# Patient Record
Sex: MALE | Race: ASIAN | NOT HISPANIC OR LATINO | URBAN - METROPOLITAN AREA
[De-identification: names, ages, dates, MRNs, and addresses within clinical notes are randomized per-mention and may not be internally consistent; named-entity substitution may affect disease eponyms.]

---

## 2020-01-06 ENCOUNTER — EMERGENCY (EMERGENCY)
Facility: HOSPITAL | Age: 55
LOS: 1 days | Discharge: HOME | End: 2020-01-06
Attending: EMERGENCY MEDICINE | Admitting: EMERGENCY MEDICINE
Payer: COMMERCIAL

## 2020-01-06 VITALS
SYSTOLIC BLOOD PRESSURE: 152 MMHG | HEART RATE: 77 BPM | TEMPERATURE: 98 F | OXYGEN SATURATION: 96 % | RESPIRATION RATE: 17 BRPM | DIASTOLIC BLOOD PRESSURE: 86 MMHG

## 2020-01-06 VITALS
HEART RATE: 76 BPM | TEMPERATURE: 98 F | OXYGEN SATURATION: 99 % | RESPIRATION RATE: 19 BRPM | DIASTOLIC BLOOD PRESSURE: 86 MMHG | SYSTOLIC BLOOD PRESSURE: 143 MMHG

## 2020-01-06 LAB
ALBUMIN SERPL ELPH-MCNC: 4.5 G/DL — SIGNIFICANT CHANGE UP (ref 3.3–5)
ALP SERPL-CCNC: 44 U/L — SIGNIFICANT CHANGE UP (ref 40–120)
ALT FLD-CCNC: 19 U/L — SIGNIFICANT CHANGE UP (ref 4–41)
ANION GAP SERPL CALC-SCNC: 13 MMO/L — SIGNIFICANT CHANGE UP (ref 7–14)
AST SERPL-CCNC: 13 U/L — SIGNIFICANT CHANGE UP (ref 4–40)
BASOPHILS # BLD AUTO: 0.07 K/UL — SIGNIFICANT CHANGE UP (ref 0–0.2)
BASOPHILS NFR BLD AUTO: 1 % — SIGNIFICANT CHANGE UP (ref 0–2)
BILIRUB SERPL-MCNC: 0.2 MG/DL — SIGNIFICANT CHANGE UP (ref 0.2–1.2)
BUN SERPL-MCNC: 16 MG/DL — SIGNIFICANT CHANGE UP (ref 7–23)
CALCIUM SERPL-MCNC: 9.7 MG/DL — SIGNIFICANT CHANGE UP (ref 8.4–10.5)
CHLORIDE SERPL-SCNC: 101 MMOL/L — SIGNIFICANT CHANGE UP (ref 98–107)
CO2 SERPL-SCNC: 26 MMOL/L — SIGNIFICANT CHANGE UP (ref 22–31)
CREAT SERPL-MCNC: 1.5 MG/DL — HIGH (ref 0.5–1.3)
EOSINOPHIL # BLD AUTO: 0.45 K/UL — SIGNIFICANT CHANGE UP (ref 0–0.5)
EOSINOPHIL NFR BLD AUTO: 6.2 % — HIGH (ref 0–6)
GLUCOSE SERPL-MCNC: 161 MG/DL — HIGH (ref 70–99)
HCT VFR BLD CALC: 43 % — SIGNIFICANT CHANGE UP (ref 39–50)
HGB BLD-MCNC: 14 G/DL — SIGNIFICANT CHANGE UP (ref 13–17)
IMM GRANULOCYTES NFR BLD AUTO: 1 % — SIGNIFICANT CHANGE UP (ref 0–1.5)
LYMPHOCYTES # BLD AUTO: 2.24 K/UL — SIGNIFICANT CHANGE UP (ref 1–3.3)
LYMPHOCYTES # BLD AUTO: 30.9 % — SIGNIFICANT CHANGE UP (ref 13–44)
MCHC RBC-ENTMCNC: 27.2 PG — SIGNIFICANT CHANGE UP (ref 27–34)
MCHC RBC-ENTMCNC: 32.6 % — SIGNIFICANT CHANGE UP (ref 32–36)
MCV RBC AUTO: 83.7 FL — SIGNIFICANT CHANGE UP (ref 80–100)
MONOCYTES # BLD AUTO: 0.55 K/UL — SIGNIFICANT CHANGE UP (ref 0–0.9)
MONOCYTES NFR BLD AUTO: 7.6 % — SIGNIFICANT CHANGE UP (ref 2–14)
NEUTROPHILS # BLD AUTO: 3.87 K/UL — SIGNIFICANT CHANGE UP (ref 1.8–7.4)
NEUTROPHILS NFR BLD AUTO: 53.3 % — SIGNIFICANT CHANGE UP (ref 43–77)
NRBC # FLD: 0 K/UL — SIGNIFICANT CHANGE UP (ref 0–0)
PLATELET # BLD AUTO: 236 K/UL — SIGNIFICANT CHANGE UP (ref 150–400)
PMV BLD: 10.9 FL — SIGNIFICANT CHANGE UP (ref 7–13)
POTASSIUM SERPL-MCNC: 4.2 MMOL/L — SIGNIFICANT CHANGE UP (ref 3.5–5.3)
POTASSIUM SERPL-SCNC: 4.2 MMOL/L — SIGNIFICANT CHANGE UP (ref 3.5–5.3)
PROT SERPL-MCNC: 7.4 G/DL — SIGNIFICANT CHANGE UP (ref 6–8.3)
RBC # BLD: 5.14 M/UL — SIGNIFICANT CHANGE UP (ref 4.2–5.8)
RBC # FLD: 13.1 % — SIGNIFICANT CHANGE UP (ref 10.3–14.5)
SODIUM SERPL-SCNC: 140 MMOL/L — SIGNIFICANT CHANGE UP (ref 135–145)
TROPONIN T, HIGH SENSITIVITY: 10 NG/L — SIGNIFICANT CHANGE UP (ref ?–14)
TSH SERPL-MCNC: 2.48 UIU/ML — SIGNIFICANT CHANGE UP (ref 0.27–4.2)
WBC # BLD: 7.25 K/UL — SIGNIFICANT CHANGE UP (ref 3.8–10.5)
WBC # FLD AUTO: 7.25 K/UL — SIGNIFICANT CHANGE UP (ref 3.8–10.5)

## 2020-01-06 PROCEDURE — 93010 ELECTROCARDIOGRAM REPORT: CPT

## 2020-01-06 PROCEDURE — 99284 EMERGENCY DEPT VISIT MOD MDM: CPT | Mod: 25

## 2020-01-06 RX ORDER — MECLIZINE HCL 12.5 MG
25 TABLET ORAL ONCE
Refills: 0 | Status: COMPLETED | OUTPATIENT
Start: 2020-01-06 | End: 2020-01-06

## 2020-01-06 RX ORDER — AMLODIPINE BESYLATE 2.5 MG/1
10 TABLET ORAL ONCE
Refills: 0 | Status: COMPLETED | OUTPATIENT
Start: 2020-01-06 | End: 2020-01-06

## 2020-01-06 RX ADMIN — AMLODIPINE BESYLATE 10 MILLIGRAM(S): 2.5 TABLET ORAL at 11:05

## 2020-01-06 RX ADMIN — Medication 25 MILLIGRAM(S): at 15:34

## 2020-01-06 RX ADMIN — Medication 25 MILLIGRAM(S): at 11:05

## 2020-01-06 NOTE — ED PROVIDER NOTE - ENMT, MLM
TM's clear bilaterally. No foreign body or cerumen in bilateral ear canals. Neck supple with no anterior mass. MMM's. No nystagmus.

## 2020-01-06 NOTE — ED ADULT NURSE REASSESSMENT NOTE - GENERAL PATIENT STATE
comfortable appearance/pt initially improved after 2nd dose of meclazine, but again has complaints of dizziness following duplex./family/SO at bedside

## 2020-01-06 NOTE — ED PROVIDER NOTE - PATIENT PORTAL LINK FT
You can access the FollowMyHealth Patient Portal offered by [facility] by registering at the following website: [email or web address]. By joining [employer]’s FollowMyHealth portal, you will also be able to view your health information using other applications (apps) compatible with our system.

## 2020-01-06 NOTE — ED PROVIDER NOTE - OBJECTIVE STATEMENT
53 y/o male presents to ED with two episodes of dizziness upon waking up in the middle of the night onset two nights ago. As per patient he woke up at 4am last night to use the bathroom, however he was overcome with dizziness which eventually resolved. The patient reports the dizziness as lightheadedness. His son assisted him with walking and the patient reports he felt the room spinning. After returning from the bathroom the dizziness improved and the patient notes he was able to get back into bed. He also notes he felt nauseous, however he had no episodes of emesis. The patient denies vomiting, diarrhea, fever, chills, headache, earache, recent travel, sick contacts, or any other medical problems. NKDA. IUTD.    PMH: Diabetes  Thyroid issues  High cholesterol  Hypertension  High lipids   PSH: negative  FH/SH: non-contributory, except as noted in the HPI  Allergies: No known drug allergies  Immunizations: Up-to-date  Medications: Amlodipine  10 mg in the morning (Did not take this morning)   Ramipril 10 mg at night (The patient took it took last night)   Methimazole   Metformin  One more medication the patient cannot recall.

## 2020-01-06 NOTE — ED ADULT NURSE REASSESSMENT NOTE - SYMPTOMS
pt continues to c.o. dizziness and wants more medicine as per wife. pt spoken to, does c.o. continued dizziness

## 2020-01-06 NOTE — ED PROVIDER NOTE - NSFOLLOWUPINSTRUCTIONS_ED_ALL_ED_FT
As we discussed return immediately to ER for persistent dizziness, chest pain or any symptoms in which you feel you require immediate care  Continue to hydrate yourself  Follow up with PCP in the next week

## 2020-01-06 NOTE — ED PROVIDER NOTE - CLINICAL SUMMARY MEDICAL DECISION MAKING FREE TEXT BOX
55 y/o male presents to ED with two episodes of dizziness upon waking up in the middle of the night onset two nights ago. likely vertigo and likely from middle ear source. Give Meclizine as well as check labs, including Troponin and thyroid. Looking for anemia, renal failure, and elevated troponin. Will not CT because neurologically functional.

## 2020-01-06 NOTE — ED PROVIDER NOTE - PHYSICAL EXAMINATION
Patient is well developed and well nourished.   Sitting in the room with his two children.  Neuro: Laying down with BP of 171/100 with a pulse of 76.   While standing with BP of 180/105 with BP of 86.   While doing orthostatic the patient became remarkably symptomatic upon laying flat.   The episode passed in moments with no nystagmus.   The patient reports he felt lightheaded and nauseous. He was sat back up and then repositioned to lay down again. He was once again symptomatic.   He was able to eventually tolerate laying back down flat.   The episodes were momentary never with nystagmus or change in neurological function.   No sensory or motor deficits.   AAO.

## 2020-01-06 NOTE — ED ADULT TRIAGE NOTE - CHIEF COMPLAINT QUOTE
pt c/o dizziness when going from lying down to standing. states he has 2 episodes since 4 am. denies cp/sob. f/s 171 by EMS pt c/o dizziness when going from lying down to standing. as per EMS ambulatory to ambulance with steady gait. states he has 2 episodes since 4 am. denies cp/sob. f/s 171 by EMS

## 2020-01-06 NOTE — ED PROVIDER NOTE - PROGRESS NOTE DETAILS
signed out to Dr. Hartley Naeem: pt feeling better; no symptoms; gait stable on my exam no cerebellar findings; spoke with Dr. Uriarte arterial duplex was normal; will d/c home to follow up with PCP

## 2020-01-06 NOTE — ED ADULT NURSE NOTE - CHIEF COMPLAINT QUOTE
pt c/o dizziness when going from lying down to standing. as per EMS ambulatory to ambulance with steady gait. states he has 2 episodes since 4 am. denies cp/sob. f/s 171 by EMS

## 2020-01-06 NOTE — ED ADULT NURSE NOTE - NSIMPLEMENTINTERV_GEN_ALL_ED
Implemented All Universal Safety Interventions:  [place] to call system. Call bell, personal items and telephone within reach. Instruct patient to call for assistance. Room bathroom lighting operational. Non-slip footwear when patient is off stretcher. Physically safe environment: no spills, clutter or unnecessary equipment. Stretcher in lowest position, wheels locked, appropriate side rails in place.

## 2020-01-06 NOTE — ED ADULT NURSE NOTE - OBJECTIVE STATEMENT
Pt presents to intake, A&Ox4, ambulatory w/o assistance, here for evaluation of dizziness since 4am, pt states he becomes dizzines when he changes position too fast. Denies chest pain, shortness of breath, palpitations, diaphoresis, headaches, fevers, dizziness, nausea, vomiting, diarrhea, or urinary symptoms at this time. Pt states he has not taken his BP meds this morning. No IV at this time as per MD orders, butterflied for labs by EDT at this time, meds given as ordered. Will continue to monitor.

## 2023-05-17 PROBLEM — Z00.00 ENCOUNTER FOR PREVENTIVE HEALTH EXAMINATION: Status: ACTIVE | Noted: 2023-05-17

## 2023-06-02 ENCOUNTER — APPOINTMENT (OUTPATIENT)
Dept: UROLOGY | Facility: CLINIC | Age: 58
End: 2023-06-02
Payer: COMMERCIAL

## 2023-06-02 VITALS
BODY MASS INDEX: 31.1 KG/M2 | RESPIRATION RATE: 17 BRPM | DIASTOLIC BLOOD PRESSURE: 88 MMHG | WEIGHT: 210 LBS | TEMPERATURE: 99.1 F | HEIGHT: 69 IN | SYSTOLIC BLOOD PRESSURE: 134 MMHG | HEART RATE: 79 BPM

## 2023-06-02 DIAGNOSIS — N28.9 DISORDER OF KIDNEY AND URETER, UNSPECIFIED: ICD-10-CM

## 2023-06-02 PROCEDURE — 99203 OFFICE O/P NEW LOW 30 MIN: CPT

## 2023-06-02 NOTE — HISTORY OF PRESENT ILLNESS
[FreeTextEntry1] : He is a 58  year old male who had CT scan in the setting of diarrhea after his return from [place].  Denies hematuria or any  other symptoms.  During work up , he was also found to have thyroid nodule for which they will be seeing endocrinology. \par \par PMH : HTN, HLP \par PSH : None\par FH : dad passed away from leukemia. Mom passed away from breast cancer \par SH:  , smoker , 30 pack /year , Three children all heallthy

## 2023-06-02 NOTE — ASSESSMENT
[FreeTextEntry1] : Reviewed images . Ct was without contrast which limits its interpretation . Patient states that his Cr is above 2 and he cant receive dye. Will proceed with US bubble study . \par He is going to [place] and will be back in August

## 2023-06-08 ENCOUNTER — APPOINTMENT (OUTPATIENT)
Dept: ULTRASOUND IMAGING | Facility: CLINIC | Age: 58
End: 2023-06-08

## 2023-06-09 ENCOUNTER — OUTPATIENT (OUTPATIENT)
Dept: OUTPATIENT SERVICES | Facility: HOSPITAL | Age: 58
LOS: 1 days | End: 2023-06-09
Payer: COMMERCIAL

## 2023-06-09 ENCOUNTER — APPOINTMENT (OUTPATIENT)
Dept: ULTRASOUND IMAGING | Facility: HOSPITAL | Age: 58
End: 2023-06-09

## 2023-06-09 ENCOUNTER — RESULT REVIEW (OUTPATIENT)
Age: 58
End: 2023-06-09

## 2023-06-09 DIAGNOSIS — N28.9 DISORDER OF KIDNEY AND URETER, UNSPECIFIED: ICD-10-CM

## 2023-06-09 PROCEDURE — 76978 US TRGT DYN MBUBB 1ST LES: CPT

## 2023-06-09 PROCEDURE — 76978 US TRGT DYN MBUBB 1ST LES: CPT | Mod: 26

## 2023-06-09 PROCEDURE — 76775 US EXAM ABDO BACK WALL LIM: CPT

## 2023-06-09 PROCEDURE — 76775 US EXAM ABDO BACK WALL LIM: CPT | Mod: 26,59

## 2023-08-16 ENCOUNTER — APPOINTMENT (OUTPATIENT)
Dept: ENDOCRINOLOGY | Facility: CLINIC | Age: 58
End: 2023-08-16
Payer: COMMERCIAL

## 2023-08-16 VITALS
SYSTOLIC BLOOD PRESSURE: 136 MMHG | OXYGEN SATURATION: 97 % | WEIGHT: 208.25 LBS | HEART RATE: 77 BPM | HEIGHT: 69 IN | DIASTOLIC BLOOD PRESSURE: 74 MMHG | TEMPERATURE: 98.6 F | BODY MASS INDEX: 30.84 KG/M2

## 2023-08-16 DIAGNOSIS — F17.200 NICOTINE DEPENDENCE, UNSPECIFIED, UNCOMPLICATED: ICD-10-CM

## 2023-08-16 DIAGNOSIS — E78.2 MIXED HYPERLIPIDEMIA: ICD-10-CM

## 2023-08-16 DIAGNOSIS — I10 ESSENTIAL (PRIMARY) HYPERTENSION: ICD-10-CM

## 2023-08-16 DIAGNOSIS — N18.32 CHRONIC KIDNEY DISEASE, STAGE 3B: ICD-10-CM

## 2023-08-16 DIAGNOSIS — Z87.891 PERSONAL HISTORY OF NICOTINE DEPENDENCE: ICD-10-CM

## 2023-08-16 LAB
GLUCOSE BLDC GLUCOMTR-MCNC: 170
HBA1C MFR BLD HPLC: 7.1

## 2023-08-16 PROCEDURE — 83036 HEMOGLOBIN GLYCOSYLATED A1C: CPT | Mod: QW

## 2023-08-16 PROCEDURE — 99204 OFFICE O/P NEW MOD 45 MIN: CPT

## 2023-08-16 PROCEDURE — 82962 GLUCOSE BLOOD TEST: CPT

## 2023-08-16 RX ORDER — BLOOD SUGAR DIAGNOSTIC
STRIP MISCELLANEOUS DAILY
Qty: 90 | Refills: 2 | Status: ACTIVE | COMMUNITY
Start: 2023-08-16 | End: 1900-01-01

## 2023-08-16 RX ORDER — SEMAGLUTIDE 1.34 MG/ML
4 INJECTION, SOLUTION SUBCUTANEOUS
Qty: 12 | Refills: 1 | Status: ACTIVE | COMMUNITY
Start: 2023-08-16 | End: 1900-01-01

## 2023-08-16 RX ORDER — GLUCOSAM/CHON-MSM1/C/MANG/BOSW 500-416.6
TABLET ORAL
Qty: 1 | Refills: 0 | Status: ACTIVE | COMMUNITY
Start: 2023-08-16 | End: 1900-01-01

## 2023-08-16 RX ORDER — METHIMAZOLE 10 MG/1
10 TABLET ORAL
Refills: 0 | Status: ACTIVE | COMMUNITY

## 2023-08-16 RX ORDER — GLIMEPIRIDE 2 MG/1
2 TABLET ORAL DAILY
Qty: 90 | Refills: 1 | Status: ACTIVE | COMMUNITY
Start: 2023-08-16 | End: 1900-01-01

## 2023-08-16 RX ORDER — DEXAMETHASONE 1 MG/1
1 TABLET ORAL
Qty: 1 | Refills: 0 | Status: ACTIVE | COMMUNITY
Start: 2023-08-16 | End: 1900-01-01

## 2023-08-16 RX ORDER — LANCETS 33 GAUGE
EACH MISCELLANEOUS
Qty: 1 | Refills: 3 | Status: ACTIVE | COMMUNITY
Start: 2023-08-16 | End: 1900-01-01

## 2023-08-16 RX ORDER — SITAGLIPTIN AND METFORMIN HYDROCHLORIDE 50; 1000 MG/1; MG/1
TABLET, FILM COATED ORAL
Refills: 0 | Status: ACTIVE | COMMUNITY

## 2023-08-16 RX ORDER — FENOFIBRATE 150 MG/1
CAPSULE ORAL
Refills: 0 | Status: ACTIVE | COMMUNITY

## 2023-08-16 RX ORDER — METOPROLOL TARTRATE 75 MG/1
TABLET, FILM COATED ORAL
Refills: 0 | Status: ACTIVE | COMMUNITY

## 2023-08-16 RX ORDER — RAMIPRIL 5 MG/1
CAPSULE ORAL
Refills: 0 | Status: ACTIVE | COMMUNITY

## 2023-08-16 RX ORDER — BLOOD-GLUCOSE METER
70 EACH MISCELLANEOUS
Qty: 3 | Refills: 3 | Status: ACTIVE | COMMUNITY
Start: 2023-08-16 | End: 1900-01-01

## 2023-08-16 NOTE — PHYSICAL EXAM
[Alert] : alert [Well Nourished] : well nourished [Obese] : obese [No Acute Distress] : no acute distress [Well Developed] : well developed [Normal Sclera/Conjunctiva] : normal sclera/conjunctiva [EOMI] : extra ocular movement intact [No Proptosis] : no proptosis [Normal Oropharynx] : the oropharynx was normal [No Respiratory Distress] : no respiratory distress [No Accessory Muscle Use] : no accessory muscle use [Clear to Auscultation] : lungs were clear to auscultation bilaterally [Normal S1, S2] : normal S1 and S2 [Normal Rate] : heart rate was normal [Regular Rhythm] : with a regular rhythm [No Edema] : no peripheral edema [Pedal Pulses Normal] : the pedal pulses are present [Normal Bowel Sounds] : normal bowel sounds [Not Tender] : non-tender [Not Distended] : not distended [Soft] : abdomen soft [Normal Anterior Cervical Nodes] : no anterior cervical lymphadenopathy [Normal Posterior Cervical Nodes] : no posterior cervical lymphadenopathy [No Spinal Tenderness] : no spinal tenderness [Spine Straight] : spine straight [No Stigmata of Cushings Syndrome] : no stigmata of Cushings Syndrome [Normal Gait] : normal gait [Normal Strength/Tone] : muscle strength and tone were normal [No Rash] : no rash [Normal Reflexes] : deep tendon reflexes were 2+ and symmetric [No Tremors] : no tremors [Oriented x3] : oriented to person, place, and time [Acanthosis Nigricans] : no acanthosis nigricans [de-identified] : left sided thyroid enlargement

## 2023-08-16 NOTE — REASON FOR VISIT
[DM Type 2] : DM Type 2 [Adrenal Evaluation/Adrenal Disorder] : adrenal evaluation/adrenal disorder [Thyroid nodule/ MNG] : thyroid nodule/ MNG [Weight Management/Obesity] : weight management/obesity [Hyperthyroidism] : hyperthyroidism

## 2023-08-16 NOTE — HISTORY OF PRESENT ILLNESS
[FreeTextEntry1] : 58 yearM here for assessment for Type 2 diabetes mellitus, Hyperthyroidism, Thyroid nodule, Adrenal nodule, obesity   last A1c: 7.1%  Patient with past medical history as below, remarkable for CKD 3b  Thirst and frequent urination:  no  Dry skin:  no  Vision problems: stable  Burning pain or tingling in the feet, legs, hands, other areas: manageable  High blood pressure:  no  Extreme hunger: no  Frequent and/or recurring urinary infections: no  Skin infections:  no   Slow healing of cuts: no     Screening  Ophthalmology: follows Microalbumin: Cr:  +ve, on ramipril  EGFR: 35   Current diabetic medication regimen (verified with patient):   janumet daily  ozempic 0.5mg Q weekly  glimepiride 2mg po daily   SMBG ranges (glucometer):  Does not check    58-year M here for assessment of adrenal adenoma:  1.4cm left adrenal adenoma -1.4 HU  Date of Imagin2023 Modality: CT A/P, non-contrast, patient has hx of CKD   History of known prior/ current malignancy: No     Episodic headaches: No  Episodic sweating: No Forceful palpitations: No Tremors: No Pallor: No Dyspnea: No  Generalized weakness: No Panic attacks: No Weight loss: No Polyuria: No Polydipsia: No  Constipation: No  Known history of elevations in blood pressure related to diagnostic procedures, anesthesia induction, surgery, with certain foods or beverages: No  History of HTN: yes  Renal impairment: yes  Appreciable fat buildup in face, neck, back, abdomen, chest: No Arms and legs becoming thin: No Purple stretch marks: No  Hyperthyroidism   Related thyroid hx:   Prior or current medication thyroid use: MMI 10mg po daily, has been using for 4 years  Known family or personal hx of thyroid disease: yes Goiter or hx of goiter: yes  Known Hx of autoimmune disease: No History of Radioactive iodine therapy/ Chest or Neck radiation therapy: No  Reported symptoms:   Fatigue: No Weight loss without significant change in appetite: No   Heat intolerance: No Irritability, anxiety or agitation: No Weakness, tremor: No Palpitations: No  Exertional dyspnea: No Hyper defecation: No Anterior neck pain: No  Insomnia: No  Dyspnea: No Dysphagia: No   Thyroid nodule:   Left lobe 4cm mass with calcifications, displacing trachea to the right

## 2023-09-24 ENCOUNTER — APPOINTMENT (OUTPATIENT)
Dept: ULTRASOUND IMAGING | Facility: IMAGING CENTER | Age: 58
End: 2023-09-24

## 2023-09-25 ENCOUNTER — OUTPATIENT (OUTPATIENT)
Dept: OUTPATIENT SERVICES | Facility: HOSPITAL | Age: 58
LOS: 1 days | End: 2023-09-25
Payer: COMMERCIAL

## 2023-09-25 ENCOUNTER — APPOINTMENT (OUTPATIENT)
Dept: ULTRASOUND IMAGING | Facility: IMAGING CENTER | Age: 58
End: 2023-09-25
Payer: COMMERCIAL

## 2023-09-25 DIAGNOSIS — E07.9 DISORDER OF THYROID, UNSPECIFIED: ICD-10-CM

## 2023-09-25 PROCEDURE — 76536 US EXAM OF HEAD AND NECK: CPT

## 2023-09-25 PROCEDURE — 76536 US EXAM OF HEAD AND NECK: CPT | Mod: 26

## 2023-10-19 ENCOUNTER — APPOINTMENT (OUTPATIENT)
Dept: ENDOCRINOLOGY | Facility: CLINIC | Age: 58
End: 2023-10-19
Payer: COMMERCIAL

## 2023-10-19 VITALS
SYSTOLIC BLOOD PRESSURE: 120 MMHG | OXYGEN SATURATION: 98 % | BODY MASS INDEX: 30.41 KG/M2 | WEIGHT: 205.31 LBS | HEART RATE: 82 BPM | DIASTOLIC BLOOD PRESSURE: 72 MMHG | HEIGHT: 69 IN | TEMPERATURE: 98.4 F

## 2023-10-19 DIAGNOSIS — E07.9 DISORDER OF THYROID, UNSPECIFIED: ICD-10-CM

## 2023-10-19 DIAGNOSIS — E66.9 OBESITY, UNSPECIFIED: ICD-10-CM

## 2023-10-19 DIAGNOSIS — E05.90 THYROTOXICOSIS, UNSPECIFIED W/OUT THYROTOXIC CRISIS OR STORM: ICD-10-CM

## 2023-10-19 DIAGNOSIS — E11.9 TYPE 2 DIABETES MELLITUS W/OUT COMPLICATIONS: ICD-10-CM

## 2023-10-19 DIAGNOSIS — D35.00 BENIGN NEOPLASM OF UNSPECIFIED ADRENAL GLAND: ICD-10-CM

## 2023-10-19 LAB
ALDOSTERONE SERUM: 29.4 NG/DL
CORTIS SERPL-MCNC: 3.2 UG/DL
DEXAMETHASONE LEVEL: 326 NG/DL
GLUCOSE BLDC GLUCOMTR-MCNC: 121
HBA1C MFR BLD HPLC: 6.9
METANEPHRINE, PL: <10 PG/ML
NORMETANEPHRINE, PL: 92.3 PG/ML
RENIN ACTIVITY, PLASMA: <0.167 NG/ML/HR
T4 FREE SERPL-MCNC: 1.2 NG/DL
TSH SERPL-ACNC: 2.82 UIU/ML
TSI ACT/NOR SER: 0.6 IU/L

## 2023-10-19 PROCEDURE — 99214 OFFICE O/P EST MOD 30 MIN: CPT

## 2023-10-19 PROCEDURE — 82962 GLUCOSE BLOOD TEST: CPT

## 2023-10-19 PROCEDURE — 83036 HEMOGLOBIN GLYCOSYLATED A1C: CPT | Mod: QW

## 2023-10-19 RX ORDER — DEXAMETHASONE 1 MG/1
1 TABLET ORAL
Qty: 1 | Refills: 0 | Status: ACTIVE | COMMUNITY
Start: 2023-10-19 | End: 1900-01-01

## 2023-11-09 ENCOUNTER — RESULT REVIEW (OUTPATIENT)
Age: 58
End: 2023-11-09

## 2023-11-14 ENCOUNTER — APPOINTMENT (OUTPATIENT)
Dept: ULTRASOUND IMAGING | Facility: IMAGING CENTER | Age: 58
End: 2023-11-14
Payer: COMMERCIAL

## 2023-11-14 ENCOUNTER — OUTPATIENT (OUTPATIENT)
Dept: OUTPATIENT SERVICES | Facility: HOSPITAL | Age: 58
LOS: 1 days | End: 2023-11-14
Payer: COMMERCIAL

## 2023-11-14 ENCOUNTER — RESULT REVIEW (OUTPATIENT)
Age: 58
End: 2023-11-14

## 2023-11-14 DIAGNOSIS — E07.9 DISORDER OF THYROID, UNSPECIFIED: ICD-10-CM

## 2023-11-14 PROCEDURE — 88173 CYTOPATH EVAL FNA REPORT: CPT | Mod: 26

## 2023-11-14 PROCEDURE — 10005 FNA BX W/US GDN 1ST LES: CPT

## 2023-11-14 PROCEDURE — 88173 CYTOPATH EVAL FNA REPORT: CPT

## 2023-11-20 LAB
NON-GYNECOLOGICAL CYTOLOGY STUDY: SIGNIFICANT CHANGE UP
NON-GYNECOLOGICAL CYTOLOGY STUDY: SIGNIFICANT CHANGE UP

## 2023-12-20 ENCOUNTER — APPOINTMENT (OUTPATIENT)
Dept: ENDOCRINOLOGY | Facility: CLINIC | Age: 58
End: 2023-12-20

## 2024-10-04 ENCOUNTER — APPOINTMENT (OUTPATIENT)
Dept: OPHTHALMOLOGY | Facility: CLINIC | Age: 59
End: 2024-10-04
Payer: COMMERCIAL

## 2024-10-04 ENCOUNTER — NON-APPOINTMENT (OUTPATIENT)
Age: 59
End: 2024-10-04

## 2024-10-04 ENCOUNTER — INPATIENT (INPATIENT)
Facility: HOSPITAL | Age: 59
LOS: 3 days | Discharge: HOME | End: 2024-10-08
Attending: STUDENT IN AN ORGANIZED HEALTH CARE EDUCATION/TRAINING PROGRAM | Admitting: STUDENT IN AN ORGANIZED HEALTH CARE EDUCATION/TRAINING PROGRAM
Payer: COMMERCIAL

## 2024-10-04 VITALS
OXYGEN SATURATION: 96 % | WEIGHT: 197.09 LBS | TEMPERATURE: 98 F | SYSTOLIC BLOOD PRESSURE: 166 MMHG | RESPIRATION RATE: 20 BRPM | DIASTOLIC BLOOD PRESSURE: 88 MMHG | HEART RATE: 75 BPM

## 2024-10-04 LAB
ALBUMIN SERPL ELPH-MCNC: 4.8 G/DL — SIGNIFICANT CHANGE UP (ref 3.3–5)
ALP SERPL-CCNC: 58 U/L — SIGNIFICANT CHANGE UP (ref 40–120)
ALT FLD-CCNC: 17 U/L — SIGNIFICANT CHANGE UP (ref 4–41)
ANION GAP SERPL CALC-SCNC: 15 MMOL/L — HIGH (ref 7–14)
APTT BLD: 35 SEC — SIGNIFICANT CHANGE UP (ref 24.5–35.6)
AST SERPL-CCNC: 15 U/L — SIGNIFICANT CHANGE UP (ref 4–40)
BASOPHILS # BLD AUTO: 0.07 K/UL — SIGNIFICANT CHANGE UP (ref 0–0.2)
BASOPHILS NFR BLD AUTO: 1 % — SIGNIFICANT CHANGE UP (ref 0–2)
BILIRUB SERPL-MCNC: 0.2 MG/DL — SIGNIFICANT CHANGE UP (ref 0.2–1.2)
BLD GP AB SCN SERPL QL: NEGATIVE — SIGNIFICANT CHANGE UP
BUN SERPL-MCNC: 26 MG/DL — HIGH (ref 7–23)
CALCIUM SERPL-MCNC: 10 MG/DL — SIGNIFICANT CHANGE UP (ref 8.4–10.5)
CHLORIDE SERPL-SCNC: 105 MMOL/L — SIGNIFICANT CHANGE UP (ref 98–107)
CO2 SERPL-SCNC: 24 MMOL/L — SIGNIFICANT CHANGE UP (ref 22–31)
CREAT SERPL-MCNC: 2.29 MG/DL — HIGH (ref 0.5–1.3)
EGFR: 32 ML/MIN/1.73M2 — LOW
EOSINOPHIL # BLD AUTO: 0.47 K/UL — SIGNIFICANT CHANGE UP (ref 0–0.5)
EOSINOPHIL NFR BLD AUTO: 6.5 % — HIGH (ref 0–6)
GLUCOSE SERPL-MCNC: 240 MG/DL — HIGH (ref 70–99)
HCT VFR BLD CALC: 39.4 % — SIGNIFICANT CHANGE UP (ref 39–50)
HGB BLD-MCNC: 13.1 G/DL — SIGNIFICANT CHANGE UP (ref 13–17)
IANC: 4.27 K/UL — SIGNIFICANT CHANGE UP (ref 1.8–7.4)
IMM GRANULOCYTES NFR BLD AUTO: 1 % — HIGH (ref 0–0.9)
INR BLD: 0.95 RATIO — SIGNIFICANT CHANGE UP (ref 0.85–1.16)
LYMPHOCYTES # BLD AUTO: 1.94 K/UL — SIGNIFICANT CHANGE UP (ref 1–3.3)
LYMPHOCYTES # BLD AUTO: 26.7 % — SIGNIFICANT CHANGE UP (ref 13–44)
MCHC RBC-ENTMCNC: 26.6 PG — LOW (ref 27–34)
MCHC RBC-ENTMCNC: 33.2 GM/DL — SIGNIFICANT CHANGE UP (ref 32–36)
MCV RBC AUTO: 79.9 FL — LOW (ref 80–100)
MONOCYTES # BLD AUTO: 0.45 K/UL — SIGNIFICANT CHANGE UP (ref 0–0.9)
MONOCYTES NFR BLD AUTO: 6.2 % — SIGNIFICANT CHANGE UP (ref 2–14)
NEUTROPHILS # BLD AUTO: 4.27 K/UL — SIGNIFICANT CHANGE UP (ref 1.8–7.4)
NEUTROPHILS NFR BLD AUTO: 58.6 % — SIGNIFICANT CHANGE UP (ref 43–77)
NRBC # BLD: 0 /100 WBCS — SIGNIFICANT CHANGE UP (ref 0–0)
NRBC # FLD: 0 K/UL — SIGNIFICANT CHANGE UP (ref 0–0)
PLATELET # BLD AUTO: 242 K/UL — SIGNIFICANT CHANGE UP (ref 150–400)
POTASSIUM SERPL-MCNC: 3.8 MMOL/L — SIGNIFICANT CHANGE UP (ref 3.5–5.3)
POTASSIUM SERPL-SCNC: 3.8 MMOL/L — SIGNIFICANT CHANGE UP (ref 3.5–5.3)
PROT SERPL-MCNC: 8 G/DL — SIGNIFICANT CHANGE UP (ref 6–8.3)
PROTHROM AB SERPL-ACNC: 11 SEC — SIGNIFICANT CHANGE UP (ref 9.9–13.4)
RBC # BLD: 4.93 M/UL — SIGNIFICANT CHANGE UP (ref 4.2–5.8)
RBC # FLD: 12.7 % — SIGNIFICANT CHANGE UP (ref 10.3–14.5)
RH IG SCN BLD-IMP: POSITIVE — SIGNIFICANT CHANGE UP
SODIUM SERPL-SCNC: 144 MMOL/L — SIGNIFICANT CHANGE UP (ref 135–145)
T3 SERPL-MCNC: 81 NG/DL — SIGNIFICANT CHANGE UP (ref 80–200)
T4 AB SER-ACNC: 7.76 UG/DL — SIGNIFICANT CHANGE UP (ref 5.1–13)
TSH SERPL-MCNC: 0.17 UIU/ML — LOW (ref 0.27–4.2)
WBC # BLD: 7.27 K/UL — SIGNIFICANT CHANGE UP (ref 3.8–10.5)
WBC # FLD AUTO: 7.27 K/UL — SIGNIFICANT CHANGE UP (ref 3.8–10.5)

## 2024-10-04 PROCEDURE — 92285 EXTERNAL OCULAR PHOTOGRAPHY: CPT

## 2024-10-04 PROCEDURE — 99205 OFFICE O/P NEW HI 60 MIN: CPT

## 2024-10-04 PROCEDURE — 92133 CPTRZD OPH DX IMG PST SGM ON: CPT

## 2024-10-04 PROCEDURE — 92083 EXTENDED VISUAL FIELD XM: CPT

## 2024-10-04 PROCEDURE — 99285 EMERGENCY DEPT VISIT HI MDM: CPT

## 2024-10-04 RX ORDER — METHYLPREDNISOLONE ACETATE 80 MG/ML
1000 INJECTION, SUSPENSION INTRA-ARTICULAR; INTRALESIONAL; INTRAMUSCULAR; SOFT TISSUE ONCE
Refills: 0 | Status: COMPLETED | OUTPATIENT
Start: 2024-10-04 | End: 2024-10-04

## 2024-10-04 RX ADMIN — METHYLPREDNISOLONE ACETATE 50 MILLIGRAM(S): 80 INJECTION, SUSPENSION INTRA-ARTICULAR; INTRALESIONAL; INTRAMUSCULAR; SOFT TISSUE at 23:30

## 2024-10-04 NOTE — ED ADULT NURSE NOTE - OBJECTIVE STATEMENT
Pt received to  3B a/o x 3 c/o vision loss to right eye x few weeks. pt was seen by his PMD and sent in for IV steroids. Pt denies any inj or trauma, pt states he cant see much from the right eye and that its very blurry. Respirations even and unlabored. Lung sounds clear with equal chest rise bilaterally. ABD is soft, non tender, non distended with normal active bowel sounds No complaints of chest pain, headache, nausea, dizziness, vomiting  SOB, fever, chills verbalized.. 20g iv placed to left AC. labs drawn and sent.

## 2024-10-04 NOTE — ED PROVIDER NOTE - OBJECTIVE STATEMENT
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  Patient reports blurry vision to right eye x 1 month, denies any pain in eye at this time.

## 2024-10-04 NOTE — ED PROVIDER NOTE - MUSCULOSKELETAL MINIMAL EXAM
atraumatic/normal range of motion
You can access the FollowMyHealth Patient Portal offered by [facility] by registering at the following website: [email or web address]. By joining [employer]’s FollowMyHealth portal, you will also be able to view your health information using other applications (apps) compatible with our system.

## 2024-10-04 NOTE — ED PROVIDER NOTE - CLINICAL SUMMARY MEDICAL DECISION MAKING FREE TEXT BOX
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  Thyroid studies sent. CT orbits ordered. Seen by optho in ED. Steroids ordered.

## 2024-10-04 NOTE — ED ADULT TRIAGE NOTE - CHIEF COMPLAINT QUOTE
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 Patient complains of blurry vision to right eye x 1 month, denies any pain in eye at this time. Fingerstick: 207 pmhx: DM, HLD, hyperthyroidism, HTN

## 2024-10-04 NOTE — CHART NOTE - NSCHARTNOTEFT_GEN_A_CORE
Sergio Gustafson is a 59 year old male with PMHx of Graves disease who was seen earlier by [facility] outpatient ophthalmology and was found to have thyroid eye disease OU with optic nerve atrophy OU. Patient was sent to the hospital to be admitted for IV steroids and further labwork.       Eye exam on 10/4/24:  - BCVA CF OD, 20/30 OS   - IOP 17 OD, 15 OS   - PEERLA without RAPD OU   - Confrontational fields depressed OD with supranasal sparing, full OS   - Ishihara plates OD 0/11, OS 8/11  - EOMI OU without diplopia, pain with movement   - External: Herter 31/31 at base of 105, severe proptosis with bilateral upper lid ptosis. Periorbital adipose expansion with mild resistance to retropulsion    - anterior exam is with plica and caruncle injection 2+ OU, clear cornea, 1-2NS and deep and quiet AC OU   - DFE with PED along inferior arcade OD, inferotemporal schisis OS     HVF 24-2 on 10/4/24  OD: severe loss with superior preserved window  OS: severe inferior and nasal field depression     OCT ON 10/4/24  OD RNFL thinning temp  OS bdl thinning temp    MRI with severe enlargement of EOMS, bilateral orbital fat expansion, optic nerve crowding at apex bilaterally     A/P:   60 yo male with Graves (diagnosed 15 years ago) with newly diagnosed thyroid eye disease and optic nerve atrophy bilaterally. Pt with progressive proptosis over the past year.    Plan:   - Admit to medicine   - Please start 1g solumedrol IV q6h for 3 days   - Please repeat labs free T4, T3, TSH /w reflex, thyroglobulin, TSH receptor Ab, TPO, quant gold   - Please obtain CT noncon urgently   - Recommend endocrinology consult    - Had a conversation that visual recovery is uncertain at this time   - Pt will likely need for teprotumumab in the future   - Ophthalmology to follow       RAMSES Piedra, oculoplastics attending Sergio Gustafson is a 59 year old male with PMHx of Graves disease who was seen earlier by [facility] outpatient ophthalmology and was found to have thyroid eye disease OU with optic nerve atrophy OU. Patient was sent to the hospital to be admitted for IV steroids and further labwork.       Eye exam on 10/4/24:  - BCVA CF OD, 20/30 OS   - IOP 17 OD, 15 OS   - PEERLA without RAPD OU   - Confrontational fields depressed OD with supranasal sparing, full OS   - Ishihara plates OD 0/11, OS 8/11  - EOMI OU without diplopia, pain with movement   - External: Herter 31/31 at base of 105, severe proptosis with bilateral upper lid ptosis. Periorbital adipose expansion with mild resistance to retropulsion    - anterior exam is with plica and caruncle injection 2+ OU, clear cornea, 1-2NS and deep and quiet AC OU   - DFE with PED along inferior arcade OD, inferotemporal schisis OS     HVF 24-2 on 10/4/24  OD: severe loss with superior preserved window  OS: severe inferior and nasal field depression     OCT ON 10/4/24  OD RNFL thinning temp  OS bdl thinning temp    MRI with severe enlargement of EOMS, bilateral orbital fat expansion, optic nerve crowding at apex bilaterally     Labs from 9/16/24: TSH 0.027, free T4 1.13, A1C 6,9%, CRP 4.1     A/P:   58 yo male with Graves (diagnosed 15 years ago) with newly diagnosed thyroid eye disease and optic nerve atrophy bilaterally. Pt with progressive proptosis over the past year.    Plan:   - Admit to medicine   - Please start 1g solumedrol IV q6h for 3 days   - Please repeat labs free T4, T3, TSH /w reflex, thyroglobulin, TSH receptor Ab, TPO, quant gold   - Please obtain CT noncon urgently   - Recommend endocrinology consult    - Had a conversation that visual recovery is uncertain at this time   - Pt will likely need for teprotumumab in the future   - Ophthalmology to follow       RAMSES Piedra, oculoplastics attending Sergio Gustafson is a 59 year old male with PMHx of Graves disease who was seen earlier by [facility] outpatient ophthalmology and was found to have thyroid eye disease OU with optic nerve atrophy OU. Patient was sent to the hospital to be admitted for IV steroids and further labwork.       Eye exam on 10/4/24:  - BCVA CF OD, 20/30 OS   - IOP 17 OD, 15 OS   - PEERLA without RAPD OU   - Confrontational fields depressed OD with supranasal sparing, full OS   - Ishihara plates OD 0/11, OS 8/11  - EOMI OU without diplopia, pain with movement   - External: Herter 31/31 at base of 105, severe proptosis with bilateral upper lid ptosis. Periorbital adipose expansion with mild resistance to retropulsion    - anterior exam is with plica and caruncle injection 2+ OU, clear cornea, 1-2NS and deep and quiet AC OU   - DFE with PED along inferior arcade OD, inferotemporal schisis OS     HVF 24-2 on 10/4/24  OD: severe loss with superior preserved window  OS: severe inferior and nasal field depression     OCT ON 10/4/24  OD RNFL thinning temp  OS bdl thinning temp    MRI with severe enlargement of EOMS, bilateral orbital fat expansion, optic nerve crowding at apex bilaterally     Labs from 9/16/24: TSH 0.027, free T4 1.13, A1C 6,9%, CRP 4.1     A/P:   60 yo male with Graves (diagnosed 15 years ago) with newly diagnosed thyroid eye disease and optic nerve atrophy bilaterally. Pt with progressive proptosis over the past year.    Plan:   - Admit to medicine   - Please start 250 mg solumedrol IV q6h or 1 g daily   - Please repeat labs free T4, T3, TSH /w reflex, thyroglobulin, TSH receptor Ab, TPO, quant gold   - Please obtain CT noncon urgently   - Recommend endocrinology consult    - Had a conversation that visual recovery is uncertain at this time   - Pt will likely need for teprotumumab in the future   - Ophthalmology to follow       RAMSES Piedra, oculoplastics attending

## 2024-10-04 NOTE — ED PROVIDER NOTE - NS_BEDUNITTYPES_ED_ALL_ED
HOSPITALIST PROGRESS NOTE:     Date of service: 7/29/2021    Medical Problems/ Reason for Visit:     Severe COVID-19 pneumonia  Acute hypoxemic respiratory failure due to COVID-19 infection  Sepsis due to COVID-19 pneumonia, present on admission  Atypical chest pain >> related to coughing and pneumonia.  Minimal troponin elevation >> suspect due to demand ischemia from COVID-19 infection.  Acute combined systolic and diastolic heart failure.  New onset cardiomyopathy with EF of 33% >> Suspect Takotsubo Cardiomyopathy.   Thrombocytopenia due to COVID-19 infection  AST elevation due to COVID-19 infection.  Hyponatremia, improved  Morbid obesity with BMI of 48    Subjective :   Remains on BiPAP with 100% FiO2.   He is on low-dose of Precedex drip.  He wakes up easily and answers questions appropriately.  Reports his breathing is feeling better with BiPAP on.  He is afebrile today.  Reports his cough is better.   D-dimer level increased to 10.56 this morning, started on therapeutic heparin this morning by intensivist team because he is at high risk of VTE in the setting of COVID infection.        Objective:      Vital 24 Hour Range Last Value   Temperature Temp  Min: 96.9 °F (36.1 °C)  Max: 100.8 °F (38.2 °C) 96.9 °F (36.1 °C) (07/29/21 0845)   Pulse Pulse  Min: 57  Max: 103 61 (07/29/21 1100)   Respiratory Resp  Min: 20  Max: 49 (!) 41 (07/29/21 1100)   Non-Invasive  Blood Pressure BP  Min: 130/86  Max: 169/97 133/89 (07/29/21 1100)   Pulse Oximetry SpO2  Min: 86 %  Max: 97 % 93 % (07/29/21 1100)     Admit Weight:   Weight: (!) 156.9 kg (07/26/21 1333)    Weight over the past 48 Hours:  Patient Vitals for the past 48 hrs:   Weight   07/28/21 0954 (!) 157.4 kg        BMI:   BMI (Calculated): 48.92 (07/26/21 1809)    Intake/Output:    Last Stool Occurrence:      No intake/output data recorded.    I/O last 3 completed shifts:  In: 621.4 [I.V.:621.4]  Out: -     Physical Exam:     GENERAL:  On BiPAP, appears  comfortable.  RESPIRATORY:  Appears comfortable on BiPAP, fair air entry bilateral, mild wheezes and crackles.   HEART: Regular rate and rhythm. No murmur, no gallops. No pedal edema.  ABDOMEN: Nondistended, soft and nontender. No rebound tenderness. No hernias.   NEUROLOGIC: No gross motor deficits noted in all four extremities.  Face symmetrical. Normal speech. Normal eye movements.   PSYCHIATRY:  Communicating appropriately through BiPAP mask.    Laboratory Results:    Recent Labs   Lab 07/29/21  1007 07/29/21  0425 07/28/21  0556 07/27/21  0514   SODIUM  --  139 139 138   POTASSIUM  --  4.4 3.8 4.1   CHLORIDE  --  108* 107 105   CO2  --  29 28 26   BUN  --  20 11 8   CREATININE  --  0.71 0.62* 0.64*   GLUCOSE  --  150* 107* 107*   WBC 9.8 8.3 11.9* 6.3   HGB 12.9* 12.2* 11.8* 12.2*   HCT 43.1 40.6 39.3 40.4    151 166 144   PT 10.9  --   --   --    INR 1.0  --   --   --    PTT 29  --   --   --        Recent Labs   Lab 07/27/21  0514 07/27/21  0212 07/26/21  2210   RAPDTR 0.06* 0.04 0.06*       Medications/Infusions:  Scheduled:   • methylPREDNISolone (SOLU-Medrol) IV  125 mg Intravenous 4 times per day   • acyclovir  400 mg Oral 2 times per day   • sodium chloride (PF)  10 mL Injection 2 times per day   • carvedilol  3.125 mg Oral 2 times per day   • losartan  25 mg Oral Daily   • furosemide  20 mg Intravenous Daily   • guaiFENesin-codeine  5 mL Oral TID   • remdesivir 100 mg/250 mL NS IVPB  100 mg Intravenous Daily at noon   • Magnesium Standard Replacement Protocol   Does not apply See Admin Instructions   • Potassium Standard Replacement Protocol   Does not apply See Admin Instructions   • pantoprazole  40 mg Oral Nightly       Continuous Infusions:   • heparin (porcine) 25,000 units/250 mL in dextrose 5 % infusion 12 Units/kg/hr (07/29/21 1011)   • dexMEDETomidine (PRECEDEX) 400 mcg/100 mL in sodium chloride 0.9 % infusion 0.8 mcg/kg/hr (07/29/21 1037)       Assessment and Plan:    Severe COVID-19  Pneumonia  Acute hypoxemic respiratory Failure due to COVID-19 infection  Sepsis due to COVID-19 Pneumonia, Present on admission  Morbid obesity with BMI of 48 , Possible Underlying SHUKRI.     - Workup with CT chest on admission is negative for pulmonary embolism and CT chest is suggestive findings of COVID-19 pneumonia.  - Currently on BiPAP with 100% FiO2 requirement.  Patient may need intubation if he cannot maintain his oxygenation on BiPAP.  Discussed with Pulmonary/intensivist - YUMIKO Holder today.    - Continue on  IV Solu-Medrol 125 mg every 6 hours for 3 days then will Taper.   - Continued on IV Remdesivir  - Dose of Actemra given on 07/28/2021. Will be on prophylactic acyclovir for total 28 days.  - Seen and followed by ID and Pulmonary/intensivist.  Will follow recommendations.  - D-dimer increased to 10 so therapeutic anticoagulation started with heparin GTT because of high risk of VTE.  Will also get lower extremity venous Doppler.  - Monitor labs and clinical progress.    Atypical chest pain >> Related to coughing and pneumonia.  Minimal troponin elevation >> suspect due to demand ischemia from COVID-19 infection.  Acute combined systolic and diastolic heart failure.  New onset cardiomyopathy with EF of 33% >> Suspect Takotsubo Cardiomyopathy.   Workup with 2D echocardiogram showed decreased EF of 33% with global hypocontractile left ventricle.  Denies any further chest pain currently.  Started on low-dose Coreg and losartan by Cardiology team.  Currently on IV Lasix 20 mg daily  Monitor I/O and BMP.    Thrombocytopenia due to COVID-19 infection  Platelet count is improved.     AST elevation due to COVID-19 infection.  Monitor LFTs.    Hyponatremia, improved  Continue to monitor Labs.     -------------------------------------------------------------------------------------------------------------------  Care Plan discussed with and/or Notes reviewed from :  Patient , RN and consultants.     Patient is in  very critical condition with overall Guarded Prognosis.   I discussed with patient's stepmother - Linsey today after taking verbal permission from patient.  Discussed with his brother about critical condition and guarded prognosis.    Code status: Full Resuscitation      Signed:  Carlos Manuel Farrar MD  7/29/2021  11:38 AM   MEDICINE

## 2024-10-05 ENCOUNTER — TRANSCRIPTION ENCOUNTER (OUTPATIENT)
Age: 59
End: 2024-10-05

## 2024-10-05 DIAGNOSIS — H57.89 OTHER SPECIFIED DISORDERS OF EYE AND ADNEXA: ICD-10-CM

## 2024-10-05 DIAGNOSIS — E05.00 THYROTOXICOSIS WITH DIFFUSE GOITER WITHOUT THYROTOXIC CRISIS OR STORM: ICD-10-CM

## 2024-10-05 DIAGNOSIS — N18.30 CHRONIC KIDNEY DISEASE, STAGE 3 UNSPECIFIED: ICD-10-CM

## 2024-10-05 DIAGNOSIS — H05.89 OTHER DISORDERS OF ORBIT: ICD-10-CM

## 2024-10-05 DIAGNOSIS — E11.9 TYPE 2 DIABETES MELLITUS WITHOUT COMPLICATIONS: ICD-10-CM

## 2024-10-05 DIAGNOSIS — N17.9 ACUTE KIDNEY FAILURE, UNSPECIFIED: ICD-10-CM

## 2024-10-05 DIAGNOSIS — I10 ESSENTIAL (PRIMARY) HYPERTENSION: ICD-10-CM

## 2024-10-05 DIAGNOSIS — Z29.9 ENCOUNTER FOR PROPHYLACTIC MEASURES, UNSPECIFIED: ICD-10-CM

## 2024-10-05 DIAGNOSIS — E78.5 HYPERLIPIDEMIA, UNSPECIFIED: ICD-10-CM

## 2024-10-05 LAB
GLUCOSE BLDC GLUCOMTR-MCNC: 240 MG/DL — HIGH (ref 70–99)
GLUCOSE BLDC GLUCOMTR-MCNC: 267 MG/DL — HIGH (ref 70–99)
GLUCOSE BLDC GLUCOMTR-MCNC: 321 MG/DL — HIGH (ref 70–99)
GLUCOSE BLDC GLUCOMTR-MCNC: 328 MG/DL — HIGH (ref 70–99)
THYROGLOB AB FLD IA-ACNC: 17 IU/ML — SIGNIFICANT CHANGE UP
THYROGLOB AB SERPL-ACNC: 17 IU/ML — SIGNIFICANT CHANGE UP
THYROGLOB SERPL-MCNC: 351 NG/ML — HIGH (ref 0.9–77.3)
THYROPEROXIDASE AB SERPL-ACNC: 16.2 IU/ML — SIGNIFICANT CHANGE UP
TSH RECEP AB FLD-ACNC: 7.57 IU/L — HIGH

## 2024-10-05 PROCEDURE — 70480 CT ORBIT/EAR/FOSSA W/O DYE: CPT | Mod: 26,MC

## 2024-10-05 PROCEDURE — 99222 1ST HOSP IP/OBS MODERATE 55: CPT

## 2024-10-05 PROCEDURE — 99223 1ST HOSP IP/OBS HIGH 75: CPT | Mod: GC

## 2024-10-05 RX ORDER — ALCOHOL ANTISEPTIC PADS
25 PADS, MEDICATED (EA) TOPICAL ONCE
Refills: 0 | Status: DISCONTINUED | OUTPATIENT
Start: 2024-10-05 | End: 2024-10-05

## 2024-10-05 RX ORDER — FENOFIBRATE NANOCRYSTALLIZED 145 MG
145 TABLET ORAL DAILY
Refills: 0 | Status: DISCONTINUED | OUTPATIENT
Start: 2024-10-05 | End: 2024-10-06

## 2024-10-05 RX ORDER — SODIUM CHLORIDE IRRIG SOLUTION 0.9 %
1000 SOLUTION, IRRIGATION IRRIGATION
Refills: 0 | Status: DISCONTINUED | OUTPATIENT
Start: 2024-10-05 | End: 2024-10-08

## 2024-10-05 RX ORDER — GLUCAGON INJECTION, SOLUTION 0.5 MG/.1ML
1 INJECTION, SOLUTION SUBCUTANEOUS ONCE
Refills: 0 | Status: DISCONTINUED | OUTPATIENT
Start: 2024-10-05 | End: 2024-10-05

## 2024-10-05 RX ORDER — SODIUM CHLORIDE IRRIG SOLUTION 0.9 %
1000 SOLUTION, IRRIGATION IRRIGATION
Refills: 0 | Status: DISCONTINUED | OUTPATIENT
Start: 2024-10-05 | End: 2024-10-05

## 2024-10-05 RX ORDER — METOPROLOL TARTRATE 50 MG
50 TABLET ORAL DAILY
Refills: 0 | Status: DISCONTINUED | OUTPATIENT
Start: 2024-10-05 | End: 2024-10-08

## 2024-10-05 RX ORDER — ALCOHOL ANTISEPTIC PADS
15 PADS, MEDICATED (EA) TOPICAL ONCE
Refills: 0 | Status: DISCONTINUED | OUTPATIENT
Start: 2024-10-05 | End: 2024-10-05

## 2024-10-05 RX ORDER — INSULIN REGULAR, HUMAN 100/ML
VIAL (ML) INJECTION EVERY 6 HOURS
Refills: 0 | Status: DISCONTINUED | OUTPATIENT
Start: 2024-10-05 | End: 2024-10-05

## 2024-10-05 RX ORDER — ALCOHOL ANTISEPTIC PADS
15 PADS, MEDICATED (EA) TOPICAL ONCE
Refills: 0 | Status: DISCONTINUED | OUTPATIENT
Start: 2024-10-05 | End: 2024-10-08

## 2024-10-05 RX ORDER — ALCOHOL ANTISEPTIC PADS
12.5 PADS, MEDICATED (EA) TOPICAL ONCE
Refills: 0 | Status: DISCONTINUED | OUTPATIENT
Start: 2024-10-05 | End: 2024-10-08

## 2024-10-05 RX ORDER — AMLODIPINE BESYLATE 5 MG
1 TABLET ORAL
Refills: 0 | DISCHARGE

## 2024-10-05 RX ORDER — METOPROLOL TARTRATE 50 MG
1 TABLET ORAL
Refills: 0 | DISCHARGE

## 2024-10-05 RX ORDER — INSULIN LISPRO 100/ML
VIAL (ML) SUBCUTANEOUS
Refills: 0 | Status: DISCONTINUED | OUTPATIENT
Start: 2024-10-05 | End: 2024-10-08

## 2024-10-05 RX ORDER — INSULIN LISPRO 100/ML
VIAL (ML) SUBCUTANEOUS AT BEDTIME
Refills: 0 | Status: DISCONTINUED | OUTPATIENT
Start: 2024-10-05 | End: 2024-10-08

## 2024-10-05 RX ORDER — AMLODIPINE BESYLATE 5 MG
10 TABLET ORAL DAILY
Refills: 0 | Status: DISCONTINUED | OUTPATIENT
Start: 2024-10-05 | End: 2024-10-08

## 2024-10-05 RX ORDER — ALCOHOL ANTISEPTIC PADS
12.5 PADS, MEDICATED (EA) TOPICAL ONCE
Refills: 0 | Status: DISCONTINUED | OUTPATIENT
Start: 2024-10-05 | End: 2024-10-05

## 2024-10-05 RX ORDER — FENOFIBRATE NANOCRYSTALLIZED 145 MG
1 TABLET ORAL
Refills: 0 | DISCHARGE

## 2024-10-05 RX ORDER — ROSUVASTATIN CALCIUM 20 MG/1
1 TABLET, COATED ORAL
Refills: 0 | DISCHARGE

## 2024-10-05 RX ORDER — INSULIN LISPRO 100/ML
VIAL (ML) SUBCUTANEOUS AT BEDTIME
Refills: 0 | Status: DISCONTINUED | OUTPATIENT
Start: 2024-10-05 | End: 2024-10-05

## 2024-10-05 RX ORDER — ASPIRIN 325 MG
1 TABLET ORAL
Refills: 0 | DISCHARGE

## 2024-10-05 RX ORDER — SEMAGLUTIDE 1.34 MG/ML
1 INJECTION, SOLUTION SUBCUTANEOUS
Refills: 0 | DISCHARGE

## 2024-10-05 RX ORDER — METHYLPREDNISOLONE ACETATE 80 MG/ML
250 INJECTION, SUSPENSION INTRA-ARTICULAR; INTRALESIONAL; INTRAMUSCULAR; SOFT TISSUE EVERY 6 HOURS
Refills: 0 | Status: COMPLETED | OUTPATIENT
Start: 2024-10-05 | End: 2024-10-07

## 2024-10-05 RX ORDER — MONTELUKAST SODIUM 10 MG/1
10 TABLET, FILM COATED ORAL DAILY
Refills: 0 | Status: DISCONTINUED | OUTPATIENT
Start: 2024-10-05 | End: 2024-10-08

## 2024-10-05 RX ORDER — METOPROLOL TARTRATE 50 MG
50 TABLET ORAL DAILY
Refills: 0 | Status: DISCONTINUED | OUTPATIENT
Start: 2024-10-05 | End: 2024-10-05

## 2024-10-05 RX ORDER — ALCOHOL ANTISEPTIC PADS
25 PADS, MEDICATED (EA) TOPICAL ONCE
Refills: 0 | Status: DISCONTINUED | OUTPATIENT
Start: 2024-10-05 | End: 2024-10-08

## 2024-10-05 RX ORDER — ACETAMINOPHEN 325 MG
650 TABLET ORAL EVERY 6 HOURS
Refills: 0 | Status: DISCONTINUED | OUTPATIENT
Start: 2024-10-05 | End: 2024-10-08

## 2024-10-05 RX ORDER — ACETAMINOPHEN 325 MG
1000 TABLET ORAL ONCE
Refills: 0 | Status: COMPLETED | OUTPATIENT
Start: 2024-10-05 | End: 2024-10-05

## 2024-10-05 RX ORDER — INSULIN LISPRO 100/ML
15 VIAL (ML) SUBCUTANEOUS ONCE
Refills: 0 | Status: COMPLETED | OUTPATIENT
Start: 2024-10-05 | End: 2024-10-05

## 2024-10-05 RX ORDER — INSULIN LISPRO 100/ML
15 VIAL (ML) SUBCUTANEOUS ONCE
Refills: 0 | Status: DISCONTINUED | OUTPATIENT
Start: 2024-10-05 | End: 2024-10-05

## 2024-10-05 RX ORDER — INFLUENZA VIRUS VACCINE 15; 15; 15; 15 UG/.5ML; UG/.5ML; UG/.5ML; UG/.5ML
0.5 SUSPENSION INTRAMUSCULAR ONCE
Refills: 0 | Status: DISCONTINUED | OUTPATIENT
Start: 2024-10-05 | End: 2024-10-08

## 2024-10-05 RX ORDER — GLUCAGON INJECTION, SOLUTION 0.5 MG/.1ML
1 INJECTION, SOLUTION SUBCUTANEOUS ONCE
Refills: 0 | Status: DISCONTINUED | OUTPATIENT
Start: 2024-10-05 | End: 2024-10-08

## 2024-10-05 RX ORDER — ROSUVASTATIN CALCIUM 20 MG/1
20 TABLET, COATED ORAL AT BEDTIME
Refills: 0 | Status: DISCONTINUED | OUTPATIENT
Start: 2024-10-05 | End: 2024-10-08

## 2024-10-05 RX ORDER — MONTELUKAST SODIUM 10 MG/1
1 TABLET, FILM COATED ORAL
Refills: 0 | DISCHARGE

## 2024-10-05 RX ORDER — METHYLPREDNISOLONE ACETATE 80 MG/ML
1000 INJECTION, SUSPENSION INTRA-ARTICULAR; INTRALESIONAL; INTRAMUSCULAR; SOFT TISSUE EVERY 6 HOURS
Refills: 0 | Status: DISCONTINUED | OUTPATIENT
Start: 2024-10-05 | End: 2024-10-05

## 2024-10-05 RX ADMIN — Medication 145 MILLIGRAM(S): at 22:06

## 2024-10-05 RX ADMIN — Medication 4: at 22:03

## 2024-10-05 RX ADMIN — Medication 40 MILLIGRAM(S): at 22:30

## 2024-10-05 RX ADMIN — MONTELUKAST SODIUM 10 MILLIGRAM(S): 10 TABLET, FILM COATED ORAL at 17:45

## 2024-10-05 RX ADMIN — METHYLPREDNISOLONE ACETATE 50 MILLIGRAM(S): 80 INJECTION, SUSPENSION INTRA-ARTICULAR; INTRALESIONAL; INTRAMUSCULAR; SOFT TISSUE at 17:45

## 2024-10-05 RX ADMIN — METHYLPREDNISOLONE ACETATE 50 MILLIGRAM(S): 80 INJECTION, SUSPENSION INTRA-ARTICULAR; INTRALESIONAL; INTRAMUSCULAR; SOFT TISSUE at 10:56

## 2024-10-05 RX ADMIN — Medication 4: at 17:45

## 2024-10-05 RX ADMIN — ROSUVASTATIN CALCIUM 20 MILLIGRAM(S): 20 TABLET, COATED ORAL at 22:34

## 2024-10-05 RX ADMIN — Medication 50 MILLIGRAM(S): at 22:30

## 2024-10-05 RX ADMIN — METHYLPREDNISOLONE ACETATE 50 MILLIGRAM(S): 80 INJECTION, SUSPENSION INTRA-ARTICULAR; INTRALESIONAL; INTRAMUSCULAR; SOFT TISSUE at 22:42

## 2024-10-05 RX ADMIN — Medication 15 UNIT(S): at 13:09

## 2024-10-05 NOTE — H&P ADULT - ASSESSMENT
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  CT showing b/l symmetric proptosis with optic nerve sheaths appear stretched bilaterally, thyroid ophthalmopathy, severe subconjunctival fat prolapse/herniation bilaterally, large right mastoid effusion -correlate clinically for sterile effusion versus acute otitis media. Admitted for IV steroid tx and further management. Evaluated by ophthalmology. Patient started Methylprednisolone IV 1g daily. TSH 0.17. 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  CT showing b/l symmetric proptosis with optic nerve sheaths appear stretched bilaterally, thyroid ophthalmopathy, severe subconjunctival fat prolapse/herniation bilaterally, large right mastoid effusion -correlate clinically for sterile effusion versus acute otitis media. Admitted for IV steroid tx and further management. Evaluated by ophthalmology and endocrinology.  TSH 0.17. WBC 7.27, AG 15, BUN 26, Cr 2.29, GFR 32, Also noted for KIERSTEN.     Patient started Methylprednisolone IV 1g daily.  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  CT showing b/l symmetric proptosis with optic nerve sheaths appear stretched bilaterally, thyroid ophthalmopathy, severe subconjunctival fat prolapse/herniation bilaterally, large right mastoid effusion -correlate clinically for sterile effusion versus acute otitis media. Admitted for IV steroid tx and further management. Evaluated by ophthalmology and endocrinology.  WBC 7.27, AG 15, BUN 26, Cr 2.29, GFR 32. Free T4 7.76 wnl, T3 81 wnl, TSH 0.17 low, Thyroglobulin 351 high, TPO 16.2.    Patient started Methylprednisolone IV 1g daily.

## 2024-10-05 NOTE — H&P ADULT - PROBLEM SELECTOR PLAN 3
- c/w home med: methimazole - c/w home med: methimazole 10mg  - Endocrine consulted ~Cr 2.2 in 2023 per family  - AG 15, BUN 26, Cr 2.29, GFR 32  - Monitor renal function and urine output. Avoid any potential nephrotoxins when possible and dose medications per eGFR.

## 2024-10-05 NOTE — H&P ADULT - PROBLEM SELECTOR PLAN 1
- Ophthalmology consulted - had a conversation that visual recovery is uncertain at this time   - CT showing b/l symmetric proptosis with optic nerve sheaths appear stretched bilaterally, thyroid ophthalmopathy, severe subconjunctival fat prolapse/herniation bilaterally, large right mastoid effusion -correlate clinically for sterile effusion versus acute otitis media  - s/p solumedrol IV 1 g in ED  - c/w solumedrol 250mg q6  - Repeat labs free T4 7.76, T3 81, TSH 0.17,   - Pending thyroglobulin, TSH receptor Ab, TPO, quant gold   - Endocrine consulted  - Pt will likely need for teprotumumab in the future per ophtho - Ophthalmology consulted - had a conversation that visual recovery is uncertain at this time   - CT showing b/l symmetric proptosis with optic nerve sheaths appear stretched bilaterally, thyroid ophthalmopathy, severe subconjunctival fat prolapse/herniation bilaterally, large right mastoid effusion -correlate clinically for sterile effusion versus acute otitis media  - s/p solumedrol IV 1 g in ED  - c/w solumedrol 250mg q6  - Repeat labs free T4 7.76 wnl, T3 81 wnl, TSH 0.17 low, Thyroglobulin 351 high, TPO 16.2  - Pending TSH receptor Ab, quant gold   - Endocrine consulted  - Pt will likely need for teprotumumab in the future per ophtho - Ophthalmology consulted - had a conversation that visual recovery is uncertain at this time   - CT showing b/l symmetric proptosis with optic nerve sheaths appear stretched bilaterally, thyroid ophthalmopathy, severe subconjunctival fat prolapse/herniation bilaterally, large right mastoid effusion -correlate clinically for sterile effusion versus acute otitis media  - s/p solumedrol IV 1 g in ED  - c/w solumedrol 250mg q6  - free T4 7.76 wnl, T3 81 wnl, TSH 0.17 low, Thyroglobulin 351 high, TPO 16.2  - Pending TSH receptor Ab, quant gold   - Endocrine consulted  - Pt will likely need for teprotumumab in the future per ophtho

## 2024-10-05 NOTE — DISCHARGE NOTE PROVIDER - NSDCFUSCHEDAPPT_GEN_ALL_CORE_FT
[facility]  [facility] [address] [facility]  Scheduled Appointment: 10/11/2024     [facility]  [place]  [facility]  Scheduled Appointment: 10/09/2024    [place] [facility]  [facility]  OPHTHALM [address] [facility]  Scheduled Appointment: 10/11/2024    [facility]  ENDOCRIN [address] [facility]  Scheduled Appointment: 10/30/2024

## 2024-10-05 NOTE — H&P ADULT - ATTENDING COMMENTS
Patient seen and examined. Case discussed with Dr. Benavidez     59 yr old M with history of Graves Disease, HTN, DM, HLD, CKD, eosinophilia who was sent to the ED by his outpatient ophthalmologist for MRI findings c/f thyroid disease and optic nerve atrophy. Wife is at bedside. Patient states that about 3 months he noticed bilateral eye puffiness and was following regularly with an ophthalmologist. Initially it was attributed to cataracts and his ophthalmologist  recommended cataracts surgery. However over the past month the patient noticed worsened bilateral vision, worse in the right eye and sought a second opinion at [facility] ophthalmology. He had an MRI of the orbits that showed severe enlargement of EOMS, bilateral orbital fat expansion, optic nerve crowding at apex bilaterally. Optho evaluated the patient in the ED and recommended IV steroids.       #Thyroid Eye Disease  # Optic nerve atrophy bilaterally   #Graves Disease   TSH: 0.17 , Thyroglobulin 351  TPO 16.2  Free T4 T4 wnl  -Optho eval appreciated  -c/w solumedrol 250 mg IV q6h  -c/w home methimazole for now   -Endo consulted, f/u recs      #DM  home medications: Ozempic 1mg, Farxiga 10mg, Glimepiride 2mg  -f/u endo recs for DM given steroid use   -Hold PO diabetes med while inpatient     #CKD Stage 3b   per family baseline Cr.  2.2 2023  Cr on admission at baseline   -ctm     #HTN  -c/w home BP meds     rest of care as stated above

## 2024-10-05 NOTE — H&P ADULT - NSVTERISKREFERASSESS_GEN_ALL_CORE
Refer to the Assessment tab to view/cancel completed assessment. [email or web address]

## 2024-10-05 NOTE — CONSULT NOTE ADULT - PROVIDER SPECIALTY LIST ADULT
Start taking an allergy pill daily such as claritin, zyrtec, allegrea or xyzal. Also start using a nasal steroid spray such as flonase or nasacort daily. If you are not being treated for high blood pressure, you can also take decongestant such as sudafed as needed. They can be purchased over the counter. If oral steroids were prescribed, start them tomorrow morning. Monitor for fever. Take tylenol/acetaminophen or ibuprofen as needed. Rest and hydrate. If symptoms persist or worsen, return to clinic or seek medical attention immediately.    Endocrinology

## 2024-10-05 NOTE — ED ADULT NURSE REASSESSMENT NOTE - NS ED NURSE REASSESS COMMENT FT1
Break RN: Pt resting in stretcher, at baseline orientation status, RR equal and unlabored, safety maintained, comfort provided, care plan ongoing.

## 2024-10-05 NOTE — H&P ADULT - NSHPPHYSICALEXAM_GEN_ALL_CORE
LOS:     VITALS:   T(C): 36.6 (10-05-24 @ 08:45), Max: 37.1 (10-05-24 @ 08:08)  HR: 74 (10-05-24 @ 08:45) (73 - 78)  BP: 147/83 (10-05-24 @ 08:45) (133/83 - 166/88)  RR: 17 (10-05-24 @ 08:45) (16 - 20)  SpO2: 97% (10-05-24 @ 08:45) (96% - 100%)    GENERAL: NAD  HEAD:  Atraumatic, Normocephalic  EYES: EOMI, PERRLA, conjunctiva and sclera clear*****  ENT: Moist mucous membranes  NECK: Supple, No elevated JVP.  CHEST/LUNG: Clear to auscultation bilaterally; No rales, rhonchi, or wheezes.   HEART: Regular rate and rhythm; No murmurs, rubs, or gallops  ABDOMEN: Bowel sounds present; Soft, nontender, nondistended  EXTREMITIES:  2+ Peripheral Pulses, brisk capillary refill. No clubbing, cyanosis, or edema  NERVOUS SYSTEM:  A&Ox3, no focal deficits   SKIN: No rashes or lesions LOS:     VITALS:   T(C): 36.6 (10-05-24 @ 08:45), Max: 37.1 (10-05-24 @ 08:08)  HR: 74 (10-05-24 @ 08:45) (73 - 78)  BP: 147/83 (10-05-24 @ 08:45) (133/83 - 166/88)  RR: 17 (10-05-24 @ 08:45) (16 - 20)  SpO2: 97% (10-05-24 @ 08:45) (96% - 100%)    GENERAL: NAD  HEAD:  Atraumatic, Normocephalic  EYES: EOMI and severe proptosis with bilateral upper lid ptosis. PERRL b/l; right eye: 20/400,  light eye: 20/30, pressure 16.  ENT: Moist mucous membranes  NECK: Supple, No elevated JVP.  CHEST/LUNG: Clear to auscultation bilaterally; No rales, rhonchi, or wheezes.   HEART: Regular rate and rhythm; No murmurs, rubs, or gallops  ABDOMEN: Bowel sounds present; Soft, nontender, nondistended  EXTREMITIES:  2+ Peripheral Pulses, brisk capillary refill. No clubbing, cyanosis, or edema  NERVOUS SYSTEM:  A&Ox3, no focal deficits   SKIN: No rashes or lesions

## 2024-10-05 NOTE — PATIENT PROFILE ADULT - FALL HARM RISK - UNIVERSAL INTERVENTIONS
Bed in lowest position, wheels locked, appropriate side rails in place/Call bell, personal items and telephone in reach/Instruct patient to call for assistance before getting out of bed or chair/Non-slip footwear when patient is out of bed/[place] to call system/Physically safe environment - no spills, clutter or unnecessary equipment/Purposeful Proactive Rounding/Room/bathroom lighting operational, light cord in reach

## 2024-10-05 NOTE — H&P ADULT - PROBLEM SELECTOR PLAN 4
- home med:  - ISS - home med: Ozempic 1mg, Farxiga 10mg, Glimepiride 2mg  - Endocrine consulted  - ISS - home med: Ozempic 1mg, Farxiga 10mg, Glimepiride 2mg  - Endocrine consulted  - ISS moderate given steroid tx

## 2024-10-05 NOTE — PROGRESS NOTE ADULT - SUBJECTIVE AND OBJECTIVE BOX
[facility] DEPARTMENT OF OPHTHALMOLOGY  ------------------------------------------------------------------------------  Raisa Lange MD PGY-2  ------------------------------------------------------------------------------    Interval History: No acute events overnight. Today patient denying blurred vision, eye pain, flashes, floaters, FBS, erythema, or discharge.     MEDICATIONS  (STANDING):  amLODIPine   Tablet 10 milliGRAM(s) Oral daily  dextrose 5%. 1000 milliLiter(s) (50 mL/Hr) IV Continuous <Continuous>  dextrose 5%. 1000 milliLiter(s) (100 mL/Hr) IV Continuous <Continuous>  dextrose 50% Injectable 25 Gram(s) IV Push once  dextrose 50% Injectable 25 Gram(s) IV Push once  dextrose 50% Injectable 12.5 Gram(s) IV Push once  dextrose Oral Gel 15 Gram(s) Oral once  fenofibrate Tablet 145 milliGRAM(s) Oral daily  glucagon  Injectable 1 milliGRAM(s) IntraMuscular once  influenza   Vaccine 0.5 milliLiter(s) IntraMuscular once  insulin lispro (ADMELOG) corrective regimen sliding scale   SubCutaneous at bedtime  insulin lispro (ADMELOG) corrective regimen sliding scale   SubCutaneous three times a day before meals  lisinopril 40 milliGRAM(s) Oral daily  methimazole 10 milliGRAM(s) Oral daily  methylPREDNISolone sodium succinate IVPB 250 milliGRAM(s) IV Intermittent every 6 hours  metoprolol succinate ER 50 milliGRAM(s) Oral daily  montelukast 10 milliGRAM(s) Oral daily  rosuvastatin 20 milliGRAM(s) Oral at bedtime    MEDICATIONS  (PRN):  acetaminophen     Tablet .. 650 milliGRAM(s) Oral every 6 hours PRN Temp greater or equal to 38C (100.4F), Mild Pain (1 - 3)      VITALS: T(C): 36.7 (10-05-24 @ 21:21)  T(F): 98.1 (10-05-24 @ 21:21), Max: 98.7 (10-05-24 @ 08:08)  HR: 92 (10-05-24 @ 21:21) (73 - 92)  BP: 145/86 (10-05-24 @ 21:21) (133/83 - 148/86)  RR:  (16 - 18)  SpO2:  (97% - 100%)  Wt(kg): --  General: AAO x 3, appropriate mood and affect    Ophthalmology Exam:  Visual acuity (sc): 20/400 (in supranasal field) OD 20/25+2 OS  Pupils: early release OD, PERRL OU  Ttono: 12 OD 16 OS  Extraocular movements (EOMs): -1 OU and ABduction and supraduction, no pain, no diplopia  Confrontational Visual Field (CVF): depressed OD with supranasal sparing, full OS     Pen Light Exam (PLE)  External: severe proptosis with bilateral upper lid ptosis. Periorbital adipose expansion with prolapse and mild resistance to retropulsion   Lids/Lashes/Lacrimal Ducts: Flat OU    Sclera/Conjunctiva: W+Q OU  Cornea: Cl OU  Anterior Chamber: D+F OU    Iris: Flat OU  Lens: NS OU    (exam on 10/4/24)  DFE with PED along inferior arcade OD, inferotemporal schisis OS     Jackelyn 31/31 at base of 105    HVF 24-2 on 10/4/24  OD: severe loss with superior preserved window  OS: severe inferior and nasal field depression     OCT ON 10/4/24  OD RNFL thinning temp  OS bdl thinning temp    Imaging and labs:   MRI with severe enlargement of EOMS, bilateral orbital fat expansion, optic nerve crowding at apex bilaterally     Labs from 9/16/24: TSH 0.027, free T4 1.13, A1C 6,9%, CRP 4.1

## 2024-10-05 NOTE — DISCHARGE NOTE PROVIDER - EXTENDED VTE YES NO FOR MLM ENOXAPARIN
Pt calling stating she is needing a week supply on her buPROPion until the mail order pharmacy can get her supply out in the mail.    Please call    Shiela Frank  [facility] CSS     ,

## 2024-10-05 NOTE — PROGRESS NOTE ADULT - ASSESSMENT
A/P:   60 yo male with Graves (diagnosed 15 years ago) with newly diagnosed thyroid eye disease and optic nerve atrophy bilaterally. Pt with progressive proptosis over the past year.    Plan:   - exam with some improvement today 10/5/24 VA 20/400 OD, 20/25+2 OS, color plates 1/12, 12/12   - initial exam VA CF OD, 20/30 OS and color plates OD 0/11, OS 8/11  - EOMI  -1 OU and ABduction and supraduction, no pain, no diplopia  - ext exam with severe proptosis with bilateral upper lid ptosis. Periorbital adipose expansion with prolapse and mild resistance to retropulsion   - Please start 250 mg solumedrol IV q6h or 1 g daily   - TSHrab +,  (high), TSH 0.17, T3 81, TPO 16.2 (wnl)   - Please obtain labs - quant gold   - CT noncon reviewed - bilaterally symmetric proptosis. severe diffuse enlargement of extraocular muscle bellies bilaterally, with crowding at the orbital apices bilaterally. Optic nerve sheaths appear stretched bilaterally.  - Endo recs appreciated   - Had a conversation that visual recovery is uncertain at this time   - Pt will likely need for teprotumumab in the future   - Ophthalmology to follow     SDW Dr [facility], oculoplastics attending     Raisa Lange MD PGY-2

## 2024-10-05 NOTE — DISCHARGE NOTE PROVIDER - HOSPITAL COURSE
For full details, please see H&P, progress notes, consult notes and ancillary notes.      Hospital Course:  59 M with pmhx of Graves disease, HTN, HLD, T2DM, and CKD was sent in by 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  CT showing b/l symmetric proptosis with optic nerve sheaths appear stretched bilaterally, thyroid ophthalmopathy, severe subconjunctival fat prolapse/herniation bilaterally, large right mastoid effusion -correlate clinically for sterile effusion versus acute otitis media. Admitted for IV steroid tx and further management.   Evaluated by ophthalmology and endocrinology.    WBC 7.27 wnl, AG 15, BUN 26, Cr 2.29, GFR 32. Free T4 7.76 wnl, T3 81 wnl, TSH 0.17 low, Thyroglobulin 351 high, TPO 16.2.    Patient received Methylprednisolone IV 1g in ED and on 250mg q6 on floor.     On day of discharge, patient is clinically stable with no new exam findings or acute symptoms compared to prior. The patient was seen by the attending physician on the date of discharge and deemed stable and acceptable for discharge. The patient's chronic medical conditions were treated accordingly per the patient's home medication regimen. The patient's medication reconciliation (with changes made to chronic medications), follow up appointments, discharge orders, instructions, and significant lab and diagnostic studies are as noted.     Discharge follow up action items:     1. Follow up with endo, ophthalmology  2. Follow up labs:   3. Medication changes:   4. On hold medications:  5. Incidental findings:     Patient's ordered code status: full  Patient disposition: home    Patient will be discharged to home with close follow up. For full details, please see H&P, progress notes, consult notes and ancillary notes.      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  CT showing b/l symmetric proptosis with optic nerve sheaths appear stretched bilaterally, thyroid ophthalmopathy, severe subconjunctival fat prolapse/herniation bilaterally, large right mastoid effusion -correlate clinically for sterile effusion versus acute otitis media. Admitted for IV steroid tx and further management.   Evaluated by ophthalmology and endocrinology.    WBC 7.27 wnl, AG 15, BUN 26, Cr 2.29, GFR 32. Free T4 7.76 wnl, T3 81 wnl, TSH 0.17 low, Thyroglobulin 351 high, TPO 16.2.    Patient received Methylprednisolone IV 1g in ED and on 250mg q6 on floor for total of 3 grams.     On day of discharge, patient is clinically stable with no new exam findings or acute symptoms compared to prior. The patient was seen by the attending physician on the date of discharge and deemed stable and acceptable for discharge. The patient's chronic medical conditions were treated accordingly per the patient's home medication regimen. The patient's medication reconciliation (with changes made to chronic medications), follow up appointments, discharge orders, instructions, and significant lab and diagnostic studies are as noted.     Discharge follow up action items:     1. Follow up with endo, ophthalmology  2. Follow up labs:   3. Medication changes: insulin pens  4. On hold medications:    Patient's ordered code status: full  Patient disposition: home    Patient will be discharged to home with close follow up. For full details, please see H&P, progress notes, consult notes and ancillary notes.      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  CT showing b/l symmetric proptosis with optic nerve sheaths appear stretched bilaterally, thyroid ophthalmopathy, severe subconjunctival fat prolapse/herniation bilaterally, large right mastoid effusion -correlate clinically for sterile effusion versus acute otitis media. Admitted for IV steroid tx and further management.   Evaluated by ophthalmology and endocrinology.    WBC 7.27 wnl, AG 15, BUN 26, Cr 2.29, GFR 32. Free T4 7.76 wnl, T3 81 wnl, TSH 0.17 low, Thyroglobulin 351 high, TPO 16.2.    Patient received Methylprednisolone IV 1g in ED and on 250mg q6h on floor for total of 3 grams. Pt was seen by endocrinology who recommended for the pt to be on Lantus 18u at bedtime and Admelog 9u TID before meals. Pt is to be continued on oral steroids after discharge - prednisone 80mg x 3days, prednisone 60mg x 3 days after. Because pt will be on prolonged course of steroids, pt was started on Humalog 9u TID before meals, Semglee 18u QHS upon discharge.    On day of discharge, patient is clinically stable with no new exam findings or acute symptoms compared to prior. The patient was seen by the attending physician on the date of discharge and deemed stable and acceptable for discharge. The patient's chronic medical conditions were treated accordingly per the patient's home medication regimen. The patient's medication reconciliation (with changes made to chronic medications), follow up appointments, discharge orders, instructions, and significant lab and diagnostic studies are as noted.     Discharge follow up action items:     1. Follow up with endo, ophthalmology  2. Medication changes: Humalog 9u TID before meals, Semglee 18u QHS, Protonix 40mg QD, prednisone 80mg x 3days, prednisone 60mg x 3 days after  4. On hold medications: Janumet, glimepiride    Patient's ordered code status: full  Patient disposition: home    Patient will be discharged to home with close follow up. For full details, please see H&P, progress notes, consult notes and ancillary notes.      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  CT showing b/l symmetric proptosis with optic nerve sheaths appear stretched bilaterally, thyroid ophthalmopathy, severe subconjunctival fat prolapse/herniation bilaterally, large right mastoid effusion -correlate clinically for sterile effusion versus acute otitis media. Admitted for IV steroid tx and further management.   Evaluated by ophthalmology and endocrinology.    WBC 7.27 wnl, AG 15, BUN 26, Cr 2.29, GFR 32. Free T4 7.76 wnl, T3 81 wnl, TSH 0.17 low, Thyroglobulin 351 high, TPO 16.2.    Patient received Methylprednisolone IV 1g in ED and on 250mg q6h on floor for total of 3 grams. Pt was seen by endocrinology who recommended for the pt to be on Lantus 18u at bedtime and Admelog 9u TID before meals. Pt is to be continued on oral steroids after discharge - prednisone 80mg x 3days, prednisone 60mg x 3 days after. Because pt will be on prolonged course of steroids, pt was started on Humalog 7u TID before meals, Semglee 14u QHS upon discharge.    On day of discharge, patient is clinically stable with no new exam findings or acute symptoms compared to prior. The patient was seen by the attending physician on the date of discharge and deemed stable and acceptable for discharge. The patient's chronic medical conditions were treated accordingly per the patient's home medication regimen. The patient's medication reconciliation (with changes made to chronic medications), follow up appointments, discharge orders, instructions, and significant lab and diagnostic studies are as noted.     Discharge follow up action items:     1. Follow up with endo, ophthalmology  2. Medication changes: Humalog 9u TID before meals, Semglee 18u QHS, Protonix 40mg QD, prednisone 80mg x 3days, prednisone 60mg x 3 days after  4. On hold medications: Janumet, glimepiride    Patient's ordered code status: full  Patient disposition: home    Patient will be discharged to home with close follow up. For full details, please see H&P, progress notes, consult notes and ancillary notes.      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  CT showing b/l symmetric proptosis with optic nerve sheaths appear stretched bilaterally, thyroid ophthalmopathy, severe subconjunctival fat prolapse/herniation bilaterally, large right mastoid effusion -correlate clinically for sterile effusion versus acute otitis media. Admitted for IV steroid tx and further management.   Evaluated by ophthalmology and endocrinology.    WBC 7.27 wnl, AG 15, BUN 26, Cr 2.29, GFR 32. Free T4 7.76 wnl, T3 81 wnl, TSH 0.17 low, Thyroglobulin 351 high, TPO 16.2.    Patient received Methylprednisolone IV 1g in ED and on 250mg q6h on floor for total of 3 grams. Pt was seen by endocrinology who recommended for the pt to be on Lantus 18u at bedtime and Admelog 9u TID before meals. Pt is to be continued on oral steroids after discharge - prednisone 80mg x 3days, prednisone 60mg x 3 days after. Because pt will be on prolonged course of steroids, pt was started on Humalog 7u TID before meals, Semglee 14u QHS while on prednisone 80mg as well as Humalog 5u TID before meals and Semglee 14u QHS while on prednisone 60mg upon discharge.    On day of discharge, patient is clinically stable with no new exam findings or acute symptoms compared to prior. The patient was seen by the attending physician on the date of discharge and deemed stable and acceptable for discharge. The patient's chronic medical conditions were treated accordingly per the patient's home medication regimen. The patient's medication reconciliation (with changes made to chronic medications), follow up appointments, discharge orders, instructions, and significant lab and diagnostic studies are as noted.     Discharge follow up action items:     1. Follow up with endo, ophthalmology  2. Medication changes: Humalog 7u TID before meals while on prednisone 80mg and 5u TID before meals while on prednisone 60mg, Semglee 14u QHS, Protonix 40mg QD, prednisone 80mg x 3days, prednisone 60mg x 3 days after, Farxiga 10mg QD  4. On hold medications: Janumet, glimepiride    Patient's ordered code status: full  Patient disposition: home    Patient will be discharged to home with close follow up.

## 2024-10-05 NOTE — H&P ADULT - NSHPREVIEWOFSYSTEMS_GEN_ALL_CORE
REVIEW OF SYSTEMS:    CONSTITUTIONAL: No weakness, fevers or chills  EYES/ENT: +visual changes;  No vertigo or throat pain   NECK: No pain or stiffness  RESPIRATORY: No cough, wheezing, hemoptysis; No shortness of breath  CARDIOVASCULAR: No chest pain or palpitations  GASTROINTESTINAL: No abdominal or epigastric pain. No nausea, vomiting, or hematemesis; No diarrhea or constipation. No melena or hematochezia.  GENITOURINARY: No dysuria, frequency or hematuria  NEUROLOGICAL: No numbness or weakness  SKIN: No itching, rashes

## 2024-10-05 NOTE — H&P ADULT - PROBLEM SELECTOR PLAN 2
- AG 15, BUN 26, Cr 2.29, GFR 32  - - KIERSTEN iso chronic kidney disease; ~Cr 2 in 2023  - AG 15, BUN 26, Cr 2.29, GFR 32  - Monitor renal function and urine output. Avoid any potential nephrotoxins when possible and dose medications per eGFR. - c/w home med: methimazole 10mg  - Endocrine consulted

## 2024-10-05 NOTE — DISCHARGE NOTE PROVIDER - NSDCCPCAREPLAN_GEN_ALL_CORE_FT
PRINCIPAL DISCHARGE DIAGNOSIS  Diagnosis: Thyroid eye disease  Assessment and Plan of Treatment: You were evaluated for thyroid eye disease. You were initially sent into the hopsital regarding optic nerve atrophy found on outpatient MRI for IV steroid treatment. During admission, ophthalmologists and endocrinologists were consulted.   Please follow up with ophthalmologist and endocrinologist within 1-2 weeks after discharge for further management.   Please return to the emrgency department for eye pain, pain with eye movement, change in vision, worsening eye swelling, fever, chills, chest pain, palpitation, shortness of breath, nausea, vomiting, diarrhea, abdominal pain, dizziness, headache, focal weakness, numbness, or any other concerns.     PRINCIPAL DISCHARGE DIAGNOSIS  Diagnosis: Thyroid eye disease  Assessment and Plan of Treatment: You were evaluated for thyroid eye disease. You were initially sent into the hopsital regarding optic nerve atrophy found on outpatient MRI for IV steroid treatment. During admission, ophthalmologists and endocrinologists were consulted.  You received 3 grams of IV steroids total during your stay and you will be taking a prednisone taper on dishcarge. Please also take pantoprazole as a prophylactic measure for your gasterointestinal  tract while you are on steroids.   Please follow up with ophthalmologist and endocrinologist within 1-2 weeks after discharge for further management.   Please return to the emrgency department for eye pain, pain with eye movement, change in vision, worsening eye swelling, fever, chills, chest pain, palpitation, shortness of breath, nausea, vomiting, diarrhea, abdominal pain, dizziness, headache, focal weakness, numbness, or any other concerns.     PRINCIPAL DISCHARGE DIAGNOSIS  Diagnosis: Thyroid eye disease  Assessment and Plan of Treatment: You were evaluated for thyroid eye disease. You were initially sent into the hopsital regarding optic nerve atrophy found on outpatient MRI for IV steroid treatment. During admission, ophthalmologists and endocrinologists were consulted.  You received 3 grams of IV steroids total during your stay and you will be taking a prednisone taper on discharge. From 10/9-10/10, please take prednisone 80mg each day (20mg 4 tabs). From 10/11-10/13, please take prednisone 60mg each day (20mg 3 tabs). Please also take pantoprazole as a prophylactic measure for your gasterointestinal tract while you are on steroids. You should continue taking methimazole 10mg daily.  Please follow up with ophthalmologist and endocrinologist within 1-2 weeks after discharge for further management.   Please return to the emergency department for eye pain, pain with eye movement, change in vision, worsening eye swelling, fever, chills, chest pain, palpitation, shortness of breath, nausea, vomiting, diarrhea, abdominal pain, dizziness, headache, focal weakness, numbness, or any other concerns.      SECONDARY DISCHARGE DIAGNOSES  Diagnosis: Type 2 diabetes mellitus  Assessment and Plan of Treatment: Because you were started on steroids during your admission, your blood sugar levels were elevated and treated with insulin as needed. Since you will be continuing on steroids after you leave the hospital, it is important for you to continue taking insulin. Please take Semglee 18 units at bedtime and Humalog 9 units three times a day before meals. You can continue taking Ozempic but you should stop taking Janumet and glimepride. Please follow up with the endocrinologist within 1-2 weeks after discharge for further management.  If you feel severe dizziness and syncope, please check your blood sugar level and take glucose tablets as needed.     PRINCIPAL DISCHARGE DIAGNOSIS  Diagnosis: Thyroid eye disease  Assessment and Plan of Treatment: You were evaluated for thyroid eye disease. You were initially sent into the hopsital regarding optic nerve atrophy found on outpatient MRI for IV steroid treatment. During admission, ophthalmologists and endocrinologists were consulted.  You received 3 grams of IV steroids total during your stay and you will be taking a prednisone taper on discharge. From 10/9-10/10, please take prednisone 80mg each day (20mg 4 tabs). From 10/11-10/13, please take prednisone 60mg each day (20mg 3 tabs). Please also take pantoprazole as a prophylactic measure for your gasterointestinal tract while you are on steroids. You should continue taking methimazole 10mg daily.  Please follow up with ophthalmologist and endocrinologist within 1-2 weeks after discharge for further management.   Please return to the emergency department for eye pain, pain with eye movement, change in vision, worsening eye swelling, fever, chills, chest pain, palpitation, shortness of breath, nausea, vomiting, diarrhea, abdominal pain, dizziness, headache, focal weakness, numbness, or any other concerns.      SECONDARY DISCHARGE DIAGNOSES  Diagnosis: Type 2 diabetes mellitus  Assessment and Plan of Treatment: Because you were started on steroids during your admission, your blood sugar levels were elevated and treated with insulin as needed. Since you will be continuing on steroids after you leave the hospital, it is important for you to continue taking insulin. Please take Semglee 14 units at bedtime and Humalog 7 units three times a day before meals while you are taking prednisone 80mg (10/9-10/10). Please take Semglee 14 units at bedtime and Humalog 5 units three times a day before meals while you are taking prednisone 60mg (10/11-10/13). You can continue taking Ozempic weekly, and you should start taking Farxiga 10mg each day. However, you should stop taking Janumet and glimepride. Please follow up with the endocrinologist within 1-2 weeks after discharge for further management.  If you feel severe dizziness and syncope, please check your blood sugar level and take glucose tablets as needed.     PRINCIPAL DISCHARGE DIAGNOSIS  Diagnosis: Thyroid eye disease  Assessment and Plan of Treatment: You were evaluated for thyroid eye disease. You were initially sent into the hopsital regarding optic nerve atrophy found on outpatient MRI for IV steroid treatment. During admission, ophthalmologists and endocrinologists were consulted.  You received 3 grams of IV steroids total during your stay and you will be taking a prednisone taper on discharge. From 10/9-10/10, please take prednisone 80mg each day (20mg 4 tabs). From 10/11-10/13, please take prednisone 60mg each day (20mg 3 tabs). Please also take pantoprazole as a prophylactic measure for your gasterointestinal tract while you are on steroids. You should continue taking methimazole 10mg daily.  Please follow up with ophthalmologist and endocrinologist within 1-2 weeks after discharge for further management.   Please return to the emergency department for eye pain, pain with eye movement, change in vision, worsening eye swelling, fever, chills, chest pain, palpitation, shortness of breath, nausea, vomiting, diarrhea, abdominal pain, dizziness, headache, focal weakness, numbness, or any other concerns.      SECONDARY DISCHARGE DIAGNOSES  Diagnosis: Type 2 diabetes mellitus  Assessment and Plan of Treatment: Because you were started on steroids during your admission, your blood sugar levels were elevated and treated with insulin as needed. Since you will be continuing on steroids after you leave the hospital, it is important for you to continue taking insulin. Please take Semglee 14 units at bedtime and Humalog 7 units three times a day before meals while you are taking prednisone 80mg (10/9-10/10). Please take Semglee 14 units at bedtime and Humalog 5 units three times a day before meals while you are taking prednisone 60mg (10/11-10/13). You can continue taking Ozempic weekly, and you should start taking Farxiga 10mg every day. Please stop taking Humalog once you are done with steroids. However, you should stop taking Janumet and glimepride. Please follow up with the endocrinologist within 1-2 weeks after discharge for further management.  If you feel severe dizziness and syncope, please check your blood sugar level and take glucose tablets as needed.

## 2024-10-05 NOTE — H&P ADULT - PROBLEM SELECTOR PLAN 6
- c/w home med: - c/w home med: rosuvastatin 20mg, fenofibrate 160mg  - held fenofibrate iso KIERSTEN. - c/w home med: rosuvastatin 20mg, fenofibrate 160mg

## 2024-10-05 NOTE — DISCHARGE NOTE PROVIDER - CARE PROVIDER_API CALL
[facility]  [address] [place] [address], [address]  [place] [address]  Phone: [phone]  Fax: [phone]  Established Patient  Follow Up Time: 1-3 days

## 2024-10-05 NOTE — DISCHARGE NOTE PROVIDER - NSDCMRMEDTOKEN_GEN_ALL_CORE_FT
amLODIPine 10 mg oral tablet: 1 tab(s) orally once a day  aspirin 81 mg oral capsule: 1 cap(s) orally once a day  Farxiga 10 mg oral tablet: 1 tab(s) orally once a day  fenofibrate 160 mg oral tablet: 1 tab(s) orally once a day  glimepiride 2 mg oral tablet: 1 tab(s) orally once a day  methIMAzole 10 mg oral tablet: 1 tab(s) orally once a day  metoprolol succinate 50 mg oral capsule, extended release: 1 cap(s) orally  montelukast 10 mg oral tablet: 1 tab(s) orally once a day  Ozempic 2 mg/1.5 mL (1 mg dose) subcutaneous solution: 1 milligram(s) subcutaneous once a week  ramipril 10 mg oral tablet: orally once a day  rosuvastatin 20 mg oral capsule: 1 cap(s) orally once a day   amLODIPine 10 mg oral tablet: 1 tab(s) orally once a day  aspirin 81 mg oral capsule: 1 cap(s) orally once a day  Farxiga 10 mg oral tablet: 1 tab(s) orally once a day  fenofibrate 160 mg oral tablet: 1 tab(s) orally once a day  glimepiride 2 mg oral tablet: 1 tab(s) orally once a day  Lantus Solostar Pen 100 units/mL subcutaneous solution: 10 unit(s) subcutaneous once a day (at bedtime)  methIMAzole 10 mg oral tablet: 1 tab(s) orally once a day  metoprolol succinate 50 mg oral capsule, extended release: 1 cap(s) orally  montelukast 10 mg oral tablet: 1 tab(s) orally once a day  NovoLOG FlexPen 100 units/mL injectable solution: 9 unit(s) injectable 3 times a day (before meals)  Ozempic 2 mg/1.5 mL (1 mg dose) subcutaneous solution: 1 milligram(s) subcutaneous once a week  ramipril 10 mg oral tablet: orally once a day  rosuvastatin 20 mg oral capsule: 1 cap(s) orally once a day   alcohol swabs: Apply topically to affected area 4 times a day  amLODIPine 10 mg oral tablet: 1 tab(s) orally once a day  aspirin 81 mg oral capsule: 1 cap(s) orally once a day  Farxiga 10 mg oral tablet: 1 tab(s) orally once a day  fenofibrate 160 mg oral tablet: 1 tab(s) orally once a day  glimepiride 2 mg oral tablet: 1 tab(s) orally once a day  glucometer (per patient&#x27;s insurance): Test blood sugars four times a day. Dispense #1 glucometer.  glucose 4 g oral tablet, chewable: 4 tab(s) chewed once a day as needed for  hypoglycemia Take only if blood sugar is &lt;70 mg/dL, then repeat fingerstick in 15 minutes  Insulin Pen Needles, 4mm: 1 application subcutaneously 4 times a day. ** Use with insulin pen **  lancets: 1 application subcutaneously 4 times a day  Lantus Solostar Pen 100 units/mL subcutaneous solution: 18 unit(s) subcutaneous once a day (at bedtime)  methIMAzole 10 mg oral tablet: 1 tab(s) orally once a day  metoprolol succinate 50 mg oral capsule, extended release: 1 cap(s) orally  montelukast 10 mg oral tablet: 1 tab(s) orally once a day  NovoLOG FlexPen 100 units/mL injectable solution: 9 unit(s) injectable 3 times a day (before meals)  Ozempic 2 mg/1.5 mL (1 mg dose) subcutaneous solution: 1 milligram(s) subcutaneous once a week  ramipril 10 mg oral tablet: orally once a day  rosuvastatin 20 mg oral capsule: 1 cap(s) orally once a day  test strips (per patient&#x27;s insurance): 1 application subcutaneously 4 times a day. ** Compatible with patient&#x27;s glucometer **   alcohol swabs: Apply topically to affected area 4 times a day  amLODIPine 10 mg oral tablet: 1 tab(s) orally once a day  aspirin 81 mg oral capsule: 1 cap(s) orally once a day  fenofibrate 160 mg oral tablet: 1 tab(s) orally once a day  glucometer (per patient&#x27;s insurance): Test blood sugars four times a day. Dispense #1 glucometer.  glucose 4 g oral tablet, chewable: 4 tab(s) chewed once a day as needed for  hypoglycemia Take only if blood sugar is &lt;70 mg/dL, then repeat fingerstick in 15 minutes  HumaLOG KwikPen 100 units/mL injectable solution: 9 unit(s) injectable 3 times a day (before meals)  Insulin Pen Needles, 4mm: 1 application subcutaneously 4 times a day. ** Use with insulin pen **  lancets: 1 application subcutaneously 4 times a day  methIMAzole 10 mg oral tablet: 1 tab(s) orally once a day  metoprolol succinate 50 mg oral capsule, extended release: 1 cap(s) orally  montelukast 10 mg oral tablet: 1 tab(s) orally once a day  Ozempic 2 mg/1.5 mL (1 mg dose) subcutaneous solution: 1 milligram(s) subcutaneous once a week  predniSONE 20 mg oral tablet: 4 tab(s) orally every 24 hours Please take 4 tablets (total 80mg) each day from 10/9-10/10 and 3 tablets (total 60mg) each day from 10/11-10/13.  Protonix 40 mg oral delayed release tablet: 1 tab(s) orally once a day  ramipril 10 mg oral tablet: orally once a day  rosuvastatin 20 mg oral capsule: 1 cap(s) orally once a day  Semglee (Prefilled Pen) 100 units/mL subcutaneous solution: 18 unit(s) subcutaneous once a day (at bedtime)  test strips (per patient&#x27;s insurance): 1 application subcutaneously 4 times a day. ** Compatible with patient&#x27;s glucometer **   alcohol swabs: Apply topically to affected area 4 times a day  amLODIPine 10 mg oral tablet: 1 tab(s) orally once a day  aspirin 81 mg oral capsule: 1 cap(s) orally once a day  Farxiga 10 mg oral tablet: 1 tab(s) orally once a day  fenofibrate 160 mg oral tablet: 1 tab(s) orally once a day  glucometer (per patient&#x27;s insurance): Test blood sugars four times a day. Dispense #1 glucometer.  glucose 15 g/32 mL oral gel: 1 gram(s) orally once If your blood glucose level is less than 70 mg/dL, please take this and recheck your blood glucose level in 15 min.  HumaLOG KwikPen 100 units/mL injectable solution: 7 unit(s) injectable 3 times a day (before meals) Please take 7 units three times a day before meals while you are taking prednisone 80mg (10/9-10/10). Please take 5 units three times a day before meals while you are taking prednisone 60mg (10/11-10/13). Please stop taking this medication once you are done with prednisone.  Insulin Pen Needles, 4mm: 1 application subcutaneously 4 times a day. ** Use with insulin pen **  lancets: 1 application subcutaneously 4 times a day  methIMAzole 10 mg oral tablet: 1 tab(s) orally once a day  metoprolol succinate 50 mg oral capsule, extended release: 1 cap(s) orally  montelukast 10 mg oral tablet: 1 tab(s) orally once a day  Ozempic 2 mg/1.5 mL (1 mg dose) subcutaneous solution: 1 milligram(s) subcutaneous once a week  predniSONE 20 mg oral tablet: 4 tab(s) orally every 24 hours Please take 4 tablets (total 80mg) each day from 10/9-10/10 and 3 tablets (total 60mg) each day from 10/11-10/13.  Protonix 40 mg oral delayed release tablet: 1 tab(s) orally once a day  ramipril 10 mg oral tablet: orally once a day  rosuvastatin 20 mg oral capsule: 1 cap(s) orally once a day  Semglee (Prefilled Pen) 100 units/mL subcutaneous solution: 14 unit(s) subcutaneous once a day (at bedtime)  test strips (per patient&#x27;s insurance): 1 application subcutaneously 4 times a day. ** Compatible with patient&#x27;s glucometer **

## 2024-10-05 NOTE — DISCHARGE NOTE PROVIDER - NSDCCPTREATMENT_GEN_ALL_CORE_FT
PRINCIPAL PROCEDURE  Procedure: CT orbit wo con  Findings and Treatment: FINDINGS:  GLOBES: Bilaterally symmetric proptosis.  Both globes are anteriorly   displaced by approximately 1 cm  OPTIC NERVE SHEATH: Optic nerve sheaths appear stretched bilaterally.  LACRIMAL GLANDS:  Bilaterally symmetric and unremarkable in appearance.  EXTRAOCULAR MUSCLES: There is severe diffuse enlargement of the   extraocular muscle bellies bilaterally with crowding at the orbital   apices bilaterally, consistent with thyroid ophthalmopathy.  REMAINING RETROBULBAR SPACE: There is severe subconjunctival fat prolapse/herniation bilaterally.  BONES: Partially visualized maxillofacial bones are intact.  MISCELLANEOUS:  Scattered mucosal thickening.  Mucous retention cysts versus polyps right maxillary sinus measuring up to 1.5 cm.  Large right mastoid effusion..  IMPRESSION:  Bilaterally symmetric proptosis.  Both globes are intrinsically displaced by approximately 1 cm.  The optic nerve sheaths appear stretched bilaterally.  There is severe diffuse enlargement of extraocular muscle bellies   bilaterally, with crowding at the orbital apices bilaterally, consistent   with thyroid ophthalmopathy.  There is severe subconjunctival fat prolapse/herniation bilaterally.  Large right mastoid effusion.  Correlate clinically for sterile effusion   versus acute otitis media.       PRINCIPAL PROCEDURE  Procedure: CT orbit wo con  Findings and Treatment: FINDINGS:  GLOBES: Bilaterally symmetric proptosis.  Both globes are anteriorly   displaced by approximately 1 cm  OPTIC NERVE SHEATH: Optic nerve sheaths appear stretched bilaterally.  LACRIMAL GLANDS:  Bilaterally symmetric and unremarkable in appearance.  EXTRAOCULAR MUSCLES: There is severe diffuse enlargement of the   extraocular muscle bellies bilaterally with crowding at the orbital   apices bilaterally, consistent with thyroid ophthalmopathy.  REMAINING RETROBULBAR SPACE: There is severe subconjunctival fat prolapse/herniation bilaterally.  BONES: Partially visualized maxillofacial bones are intact.  MISCELLANEOUS:  Scattered mucosal thickening.  Mucous retention cysts versus polyps right maxillary sinus measuring up to 1.5 cm.  Large right mastoid effusion..  IMPRESSION:  Bilaterally symmetric proptosis.  Both globes are intrinsically displaced by approximately 1 cm.  The optic nerve sheaths appear stretched bilaterally.  There is severe diffuse enlargement of extraocular muscle bellies   bilaterally, with crowding at the orbital apices bilaterally, consistent   with thyroid ophthalmopathy.  There is severe subconjunctival fat prolapse/herniation bilaterally.  Large right mastoid effusion.  Correlate clinically for sterile effusion   versus acute otitis media.        SECONDARY PROCEDURE  Procedure: Thyroid ultrasound  Findings and Treatment: FINDINGS:  Right Lobe: 5.1 cm x 1.9 cm x 2.2 cm. Normal echogenicity without focal   lesion.  Left Lobe: 6.2 cm x 3.9 cm x 4.3 cm. Enlarged. Normal in echogenicity.  There is 3.2 x 3.1 x 2.8 cm heterogeneous predominantly isoechoic nodule   in the lower pole, not significantly changed. (TIRAD -3).  Isthmus: 0.2 cm.  Cervical Lymph Nodes: No enlarged or abnormal morphology cervical nodes.  IMPRESSION:  Left thyroid nodule, not significantly changed and correspond to the   previously biopsied benign nodule.  TI-RAD 3: Mildly suspicious (FNA if > 2.5 cm, Follow if > 1.5 cm)

## 2024-10-05 NOTE — H&P ADULT - NSHPSOCIALHISTORY_GEN_ALL_CORE
-drinks alcohol at home weekly  -retired  -tobacco x 35 years, has not smoked for 2 wks.  -no rec drug use

## 2024-10-05 NOTE — H&P ADULT - HISTORY OF PRESENT ILLNESS
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 Notes he has had right eye blurry vision x 1 month. Denies any pain in eye at this time.    In ED, patient was started on Methylprednisolone IV 1g and TSH 0.17. CT showing b/l symmetric proptosis with  optic nerve sheaths appear stretched bilaterally. There is severe diffuse enlargement of extraocular muscle bellies bilaterally, with crowding at the orbital apices bilaterally, consistent with thyroid ophthalmopathy. There is severe subconjunctival fat prolapse/herniation bilaterally. Large right mastoid effusion.  Correlate clinically for sterile effusion versus acute otitis media. Admitted for IV steroid tx and further management.  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 States that he has had right eye puffiness x 3 months ago. Was initially evaluated by outpatient ophthalmology and exams were unremarkable at that time. Then noticed his vision was blurry (told of cataract). As the vision was notably worsen about a 1 month ago, he got a second opinion and got MRI which showed severe enlargement of EOMS, bilateral orbital fat expansion, optic nerve crowding at apex bilaterally. On 10/4, he had a follow up at [facility] outpatient ophthalmology and was sent to ED for IV steroid tx based on findings. Denies any pain in eye at this time. States he has been feeling hot and had headache intermittently x 1month.     In ED, patient was started on Methylprednisolone IV 1g. CT showing b/l symmetric proptosis with  optic nerve sheaths appear stretched bilaterally. There is severe diffuse enlargement of extraocular muscle bellies bilaterally, with crowding at the orbital apices bilaterally, consistent with thyroid ophthalmopathy. There is severe subconjunctival fat prolapse/herniation bilaterally. Large right mastoid effusion.  Correlate clinically for sterile effusion versus acute otitis media. Admitted for IV steroid tx and further management.

## 2024-10-05 NOTE — H&P ADULT - NSHPLABSRESULTS_GEN_ALL_CORE
.  LABS:                         13.1   7.27  )-----------( 242      ( 04 Oct 2024 21:41 )             39.4     10-04    144  |  105  |  26[H]  ----------------------------<  240[H]  3.8   |  24  |  2.29[H]    Ca    10.0      04 Oct 2024 21:41    TPro  8.0  /  Alb  4.8  /  TBili  0.2  /  DBili  x   /  AST  15  /  ALT  17  /  AlkPhos  58  10-04    PT/INR - ( 04 Oct 2024 21:41 )   PT: 11.0 sec;   INR: 0.95 ratio         PTT - ( 04 Oct 2024 21:41 )  PTT:35.0 sec  Urinalysis Basic - ( 04 Oct 2024 21:41 )    Color: x / Appearance: x / SG: x / pH: x  Gluc: 240 mg/dL / Ketone: x  / Bili: x / Urobili: x   Blood: x / Protein: x / Nitrite: x   Leuk Esterase: x / RBC: x / WBC x   Sq Epi: x / Non Sq Epi: x / Bacteria: x            RADIOLOGY, EKG & ADDITIONAL TESTS: Reviewed.

## 2024-10-05 NOTE — CONSULT NOTE ADULT - SUBJECTIVE AND OBJECTIVE BOX
Reason For Consult: hyperthyroidism     HPI:  59M with pmhx of Graves disease was sent in by 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 States that he has had right eye puffiness x 3 months ago. Was initially evaluated by outpatient ophthalmology and exams were unremarkable at that time. Then noticed his vision was blurry (told of cataract). As the vision was notably worsen about a 1 month ago, he got a second opinion and got MRI which showed severe enlargement of EOMS, bilateral orbital fat expansion, optic nerve crowding at apex bilaterally. On 10/4, he had a follow up at [facility] outpatient ophthalmology and was sent to ED for IV steroid tx based on findings. Denies any pain in eye at this time. States he has been feeling hot and had headache intermittently x 1month.     In ED, patient was started on Methylprednisolone IV 1g. CT showing b/l symmetric proptosis with  optic nerve sheaths appear stretched bilaterally. There is severe diffuse enlargement of extraocular muscle bellies bilaterally, with crowding at the orbital apices bilaterally, consistent with thyroid ophthalmopathy. There is severe subconjunctival fat prolapse/herniation bilaterally. Large right mastoid effusion.  Correlate clinically for sterile effusion versus acute otitis media. Admitted for IV steroid tx and further management.  (05 Oct 2024 11:53)      Endocrine called for hyperthyroidism.  Patient seen with family at bedside.  Offered patient  with [facility] however patient states that his daughter is the primary contact regarding his medical history and will defer medical history and questions to her please note that patient also speaks in English and answers questions in English intermittently and daughter will provide collateral and additional information.  Patient has a 15-year history of hyperthyroidism, also questionable history of thyroid nodule status post FNA per the family this was cyst and benign.  Patient has remained on methimazole 10 mg in the last 15 years never received radioactive iodine or surgery.  He was being worked up for cataracts they sought a second opinion and were told that he has thyroid eye disease and to be admitted to the hospital for further treatment.  Patient otherwise does not report any history of thyroid calcium pituitary disorders in the family please note patient also with a history of diabetes he is currently on Ozempic 1 mg along with glimepiride.  He checks fingersticks intermittently at home currently with hyperglycemia in the setting of steroids        PAST MEDICAL & SURGICAL HISTORY:  Diabetes mellitus type II      HTN      Dyslipidemia      Graves disease      No significant past surgical history          FAMILY HISTORY:  does not report DM or thyroid disease in family     Social History:  does not report illcitis     Outpatient Medications:  methimzole   glimperide   ozempic     MEDICATIONS  (STANDING):  amLODIPine   Tablet 10 milliGRAM(s) Oral daily  dextrose 5%. 1000 milliLiter(s) (50 mL/Hr) IV Continuous <Continuous>  dextrose 5%. 1000 milliLiter(s) (100 mL/Hr) IV Continuous <Continuous>  dextrose 50% Injectable 25 Gram(s) IV Push once  dextrose 50% Injectable 12.5 Gram(s) IV Push once  dextrose 50% Injectable 25 Gram(s) IV Push once  dextrose Oral Gel 15 Gram(s) Oral once  fenofibrate Tablet 145 milliGRAM(s) Oral daily  glucagon  Injectable 1 milliGRAM(s) IntraMuscular once  insulin lispro (ADMELOG) corrective regimen sliding scale   SubCutaneous three times a day before meals  insulin lispro (ADMELOG) corrective regimen sliding scale   SubCutaneous at bedtime  lisinopril 40 milliGRAM(s) Oral daily  methimazole 10 milliGRAM(s) Oral daily  methylPREDNISolone sodium succinate IVPB 250 milliGRAM(s) IV Intermittent every 6 hours  metoprolol succinate ER 50 milliGRAM(s) Oral daily  montelukast 10 milliGRAM(s) Oral daily  rosuvastatin 20 milliGRAM(s) Oral at bedtime    MEDICATIONS  (PRN):  acetaminophen     Tablet .. 650 milliGRAM(s) Oral every 6 hours PRN Temp greater or equal to 38C (100.4F), Mild Pain (1 - 3)      Allergies    penicillin (Hives)    Intolerances      Review of Systems:  Constitutional: No fever  Eyes: No blurry vision  Neuro: No tremors  HEENT: No pain  Cardiovascular: No chest pain, palpitations  Respiratory: No SOB, no cough  GI: No nausea, vomiting, abdominal pain  : No dysuria  Skin: no rash  Psych: no depression  Endocrine: no polyuria, polydipsia  Hem/lymph: no swelling  Osteoporosis: no fractures    ALL OTHER SYSTEMS REVIEWED AND NEGATIVE      PHYSICAL EXAM:  VITALS: T(C): 36.5 (10-05-24 @ 13:09)  T(F): 97.7 (10-05-24 @ 13:09), Max: 98.7 (10-05-24 @ 08:08)  HR: 86 (10-05-24 @ 13:09) (73 - 86)  BP: 148/82 (10-05-24 @ 13:09) (133/83 - 166/88)  RR:  (16 - 20)  SpO2:  (96% - 100%)  Wt(kg): --  GENERAL: NAD, well-groomed, well-developed  EYES: + proptosis, no lid lag, anicteric  HEENT:  Atraumatic, Normocephalic, moist mucous membranes  THYROID: Normal size, no palpable nodules  RESPIRATORY: Clear to auscultation bilaterally; No rales, rhonchi, wheezing, or rubs  CARDIOVASCULAR: Regular rate and rhythm; No murmurs; no peripheral edema  GI: Soft, nontender, non distended, normal bowel sounds  SKIN: Dry, intact, No rashes or lesions  MUSCULOSKELETAL: Full range of motion, normal strength  NEURO: sensation intact, extraocular movements intact, no tremor,   PSYCH: Alert and oriented x 3, normal affect, normal mood  CUSHING'S SIGNS: no striae    POCT Blood Glucose.: 321 mg/dL (10-05-24 @ 12:20)  POCT Blood Glucose.: 267 mg/dL (10-05-24 @ 07:41)  POCT Blood Glucose.: 207 mg/dL (10-04-24 @ 20:00)                            13.1   7.27  )-----------( 242      ( 04 Oct 2024 21:41 )             39.4       10-04    144  |  105  |  26[H]  ----------------------------<  240[H]  3.8   |  24  |  2.29[H]    eGFR: 32[L]    Ca    10.0      10-04    TPro  8.0  /  Alb  4.8  /  TBili  0.2  /  DBili  x   /  AST  15  /  ALT  17  /  AlkPhos  58  10-04      Thyroid Function Tests:  10-04 @ 21:41 TSH 0.17 FreeT4 -- T3 81 Anti TPO 16.2 Anti Thyroglobulin Ab 17.0 TSI --              Radiology:

## 2024-10-05 NOTE — CONSULT NOTE ADULT - ASSESSMENT
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 hyperthyroidism   TSHrab + - consistent with graves   get thyroid US inpt with doppler flow   send TSI  TSH 0.17 total T4 wnl  Send free T4   c/w methimzole 10mg for now   HR well controlled no role of Beta blocker for hyperthyroidism currently   steroids and Tx for MAULIK (thyroid eye disease) as per optho     dc planning  needs outpt endocrine fu   can discuss surgery as potential tx to help reduce antibody burden   optho fu to discuss Tepazza as well       2. DM  check a1c  glucose in 300s  will need basal bolus insulin  start Lantus 15  ademelog 5-5-5  moderate dose scale premeals and moderate dose scale bedtime        dc planning TBD based on steroid plan  pt is on ozempic and glimperide at home   needs glucometer education inpt for pt and family           3. HTN  goal BP in DM <130/80  outpt mc/cr ratio      4. HLD  outpt lipid panel      d/w primary team and pt and family      Teresa Patrick MD  Attending Physician   Department of Endocrinology, Diabetes and Metabolism     weekdays: 9am to 5pm: email [facility] or [facility] and or TEAMS     Nights and weekends: [phone]  Please note that this patient may be followed by a different provider tomorrow.   If no answer or after hours, please contact [phone].  For final dc reccomendations, please call [phone] or page the endocrine fellow on call.  goal LDL <70

## 2024-10-05 NOTE — H&P ADULT - PROBLEM SELECTOR PLAN 5
- c/w home med: - c/w home med: Ramipril 10mg, metoprolol 50mg, Amlodipine 10mg, aspirin 81mg  - held Ramipril iso KIERSTEN. - c/w home med: Ramipril 10mg (continued as lisinopril 40mg), metoprolol 50mg, Amlodipine 10mg, aspirin 81mg

## 2024-10-06 LAB
A1C WITH ESTIMATED AVERAGE GLUCOSE RESULT: 7.1 % — HIGH (ref 4–5.6)
ADD ON TEST-SPECIMEN IN LAB: SIGNIFICANT CHANGE UP
ALBUMIN SERPL ELPH-MCNC: 4.4 G/DL — SIGNIFICANT CHANGE UP (ref 3.3–5)
ALP SERPL-CCNC: 52 U/L — SIGNIFICANT CHANGE UP (ref 40–120)
ALT FLD-CCNC: 15 U/L — SIGNIFICANT CHANGE UP (ref 4–41)
ANION GAP SERPL CALC-SCNC: 17 MMOL/L — HIGH (ref 7–14)
APPEARANCE UR: CLEAR — SIGNIFICANT CHANGE UP
APTT BLD: 32.6 SEC — SIGNIFICANT CHANGE UP (ref 24.5–35.6)
AST SERPL-CCNC: 16 U/L — SIGNIFICANT CHANGE UP (ref 4–40)
BACTERIA # UR AUTO: NEGATIVE /HPF — SIGNIFICANT CHANGE UP
BASOPHILS # BLD AUTO: 0.02 K/UL — SIGNIFICANT CHANGE UP (ref 0–0.2)
BASOPHILS NFR BLD AUTO: 0.2 % — SIGNIFICANT CHANGE UP (ref 0–2)
BILIRUB SERPL-MCNC: 0.3 MG/DL — SIGNIFICANT CHANGE UP (ref 0.2–1.2)
BILIRUB UR-MCNC: NEGATIVE — SIGNIFICANT CHANGE UP
BUN SERPL-MCNC: 46 MG/DL — HIGH (ref 7–23)
CALCIUM SERPL-MCNC: 9.4 MG/DL — SIGNIFICANT CHANGE UP (ref 8.4–10.5)
CAST: 1 /LPF — SIGNIFICANT CHANGE UP (ref 0–4)
CHLORIDE SERPL-SCNC: 106 MMOL/L — SIGNIFICANT CHANGE UP (ref 98–107)
CO2 SERPL-SCNC: 20 MMOL/L — LOW (ref 22–31)
COLOR SPEC: YELLOW — SIGNIFICANT CHANGE UP
CREAT ?TM UR-MCNC: 34 MG/DL — SIGNIFICANT CHANGE UP
CREAT SERPL-MCNC: 2.52 MG/DL — HIGH (ref 0.5–1.3)
DIFF PNL FLD: ABNORMAL
EGFR: 28 ML/MIN/1.73M2 — LOW
EOSINOPHIL # BLD AUTO: 0.01 K/UL — SIGNIFICANT CHANGE UP (ref 0–0.5)
EOSINOPHIL NFR BLD AUTO: 0.1 % — SIGNIFICANT CHANGE UP (ref 0–6)
ESTIMATED AVERAGE GLUCOSE: 157 — SIGNIFICANT CHANGE UP
GLUCOSE BLDC GLUCOMTR-MCNC: 228 MG/DL — HIGH (ref 70–99)
GLUCOSE BLDC GLUCOMTR-MCNC: 251 MG/DL — HIGH (ref 70–99)
GLUCOSE BLDC GLUCOMTR-MCNC: 269 MG/DL — HIGH (ref 70–99)
GLUCOSE BLDC GLUCOMTR-MCNC: 301 MG/DL — HIGH (ref 70–99)
GLUCOSE SERPL-MCNC: 262 MG/DL — HIGH (ref 70–99)
GLUCOSE UR QL: >=1000 MG/DL
HCT VFR BLD CALC: 36.4 % — LOW (ref 39–50)
HGB BLD-MCNC: 12.2 G/DL — LOW (ref 13–17)
IANC: 10.32 K/UL — HIGH (ref 1.8–7.4)
IMM GRANULOCYTES NFR BLD AUTO: 1.4 % — HIGH (ref 0–0.9)
INR BLD: 1.01 RATIO — SIGNIFICANT CHANGE UP (ref 0.85–1.16)
KETONES UR-MCNC: NEGATIVE MG/DL — SIGNIFICANT CHANGE UP
LEUKOCYTE ESTERASE UR-ACNC: NEGATIVE — SIGNIFICANT CHANGE UP
LYMPHOCYTES # BLD AUTO: 1.31 K/UL — SIGNIFICANT CHANGE UP (ref 1–3.3)
LYMPHOCYTES # BLD AUTO: 10.9 % — LOW (ref 13–44)
MAGNESIUM SERPL-MCNC: 2.5 MG/DL — SIGNIFICANT CHANGE UP (ref 1.6–2.6)
MCHC RBC-ENTMCNC: 26.2 PG — LOW (ref 27–34)
MCHC RBC-ENTMCNC: 33.5 GM/DL — SIGNIFICANT CHANGE UP (ref 32–36)
MCV RBC AUTO: 78.3 FL — LOW (ref 80–100)
MONOCYTES # BLD AUTO: 0.21 K/UL — SIGNIFICANT CHANGE UP (ref 0–0.9)
MONOCYTES NFR BLD AUTO: 1.7 % — LOW (ref 2–14)
NEUTROPHILS # BLD AUTO: 10.32 K/UL — HIGH (ref 1.8–7.4)
NEUTROPHILS NFR BLD AUTO: 85.7 % — HIGH (ref 43–77)
NITRITE UR-MCNC: NEGATIVE — SIGNIFICANT CHANGE UP
NRBC # BLD: 0 /100 WBCS — SIGNIFICANT CHANGE UP (ref 0–0)
NRBC # FLD: 0 K/UL — SIGNIFICANT CHANGE UP (ref 0–0)
OSMOLALITY UR: 428 MOSM/KG — SIGNIFICANT CHANGE UP (ref 50–1200)
PH UR: 6 — SIGNIFICANT CHANGE UP (ref 5–8)
PHOSPHATE SERPL-MCNC: 4.5 MG/DL — SIGNIFICANT CHANGE UP (ref 2.5–4.5)
PLATELET # BLD AUTO: 245 K/UL — SIGNIFICANT CHANGE UP (ref 150–400)
POTASSIUM SERPL-MCNC: 3.6 MMOL/L — SIGNIFICANT CHANGE UP (ref 3.5–5.3)
POTASSIUM SERPL-SCNC: 3.6 MMOL/L — SIGNIFICANT CHANGE UP (ref 3.5–5.3)
POTASSIUM UR-SCNC: 23 MMOL/L — SIGNIFICANT CHANGE UP
PROT ?TM UR-MCNC: 43 MG/DL — SIGNIFICANT CHANGE UP
PROT SERPL-MCNC: 7.4 G/DL — SIGNIFICANT CHANGE UP (ref 6–8.3)
PROT UR-MCNC: 100 MG/DL
PROT/CREAT UR-RTO: 1.2 RATIO — HIGH (ref 0–0.2)
PROTHROM AB SERPL-ACNC: 11.7 SEC — SIGNIFICANT CHANGE UP (ref 9.9–13.4)
RBC # BLD: 4.65 M/UL — SIGNIFICANT CHANGE UP (ref 4.2–5.8)
RBC # FLD: 13.1 % — SIGNIFICANT CHANGE UP (ref 10.3–14.5)
RBC CASTS # UR COMP ASSIST: 0 /HPF — SIGNIFICANT CHANGE UP (ref 0–4)
SODIUM SERPL-SCNC: 143 MMOL/L — SIGNIFICANT CHANGE UP (ref 135–145)
SODIUM UR-SCNC: 33 MMOL/L — SIGNIFICANT CHANGE UP
SP GR SPEC: 1.02 — SIGNIFICANT CHANGE UP (ref 1–1.03)
SQUAMOUS # UR AUTO: 0 /HPF — SIGNIFICANT CHANGE UP (ref 0–5)
T4 FREE SERPL-MCNC: 1.4 NG/DL — SIGNIFICANT CHANGE UP (ref 0.9–1.8)
UROBILINOGEN FLD QL: 0.2 MG/DL — SIGNIFICANT CHANGE UP (ref 0.2–1)
UUN UR-MCNC: 413.8 MG/DL — SIGNIFICANT CHANGE UP
WBC # BLD: 12.04 K/UL — HIGH (ref 3.8–10.5)
WBC # FLD AUTO: 12.04 K/UL — HIGH (ref 3.8–10.5)
WBC UR QL: 0 /HPF — SIGNIFICANT CHANGE UP (ref 0–5)

## 2024-10-06 PROCEDURE — 99233 SBSQ HOSP IP/OBS HIGH 50: CPT

## 2024-10-06 PROCEDURE — 76536 US EXAM OF HEAD AND NECK: CPT | Mod: 26

## 2024-10-06 RX ORDER — SENNOSIDES 8.6 MG
2 TABLET ORAL AT BEDTIME
Refills: 0 | Status: DISCONTINUED | OUTPATIENT
Start: 2024-10-06 | End: 2024-10-08

## 2024-10-06 RX ORDER — INSULIN GLARGINE 300 U/ML
15 INJECTION, SOLUTION SUBCUTANEOUS AT BEDTIME
Refills: 0 | Status: DISCONTINUED | OUTPATIENT
Start: 2024-10-06 | End: 2024-10-06

## 2024-10-06 RX ORDER — INSULIN LISPRO 100/ML
5 VIAL (ML) SUBCUTANEOUS
Refills: 0 | Status: DISCONTINUED | OUTPATIENT
Start: 2024-10-06 | End: 2024-10-06

## 2024-10-06 RX ORDER — INSULIN GLARGINE 300 U/ML
18 INJECTION, SOLUTION SUBCUTANEOUS AT BEDTIME
Refills: 0 | Status: DISCONTINUED | OUTPATIENT
Start: 2024-10-06 | End: 2024-10-08

## 2024-10-06 RX ORDER — INSULIN LISPRO 100/ML
9 VIAL (ML) SUBCUTANEOUS
Refills: 0 | Status: DISCONTINUED | OUTPATIENT
Start: 2024-10-06 | End: 2024-10-08

## 2024-10-06 RX ADMIN — Medication 2: at 21:43

## 2024-10-06 RX ADMIN — METHYLPREDNISOLONE ACETATE 50 MILLIGRAM(S): 80 INJECTION, SUSPENSION INTRA-ARTICULAR; INTRALESIONAL; INTRAMUSCULAR; SOFT TISSUE at 23:19

## 2024-10-06 RX ADMIN — ROSUVASTATIN CALCIUM 20 MILLIGRAM(S): 20 TABLET, COATED ORAL at 21:42

## 2024-10-06 RX ADMIN — INSULIN GLARGINE 18 UNIT(S): 300 INJECTION, SOLUTION SUBCUTANEOUS at 21:42

## 2024-10-06 RX ADMIN — Medication 50 MILLIGRAM(S): at 21:42

## 2024-10-06 RX ADMIN — Medication 5 UNIT(S): at 12:34

## 2024-10-06 RX ADMIN — Medication 17 GRAM(S): at 09:12

## 2024-10-06 RX ADMIN — MONTELUKAST SODIUM 10 MILLIGRAM(S): 10 TABLET, FILM COATED ORAL at 12:30

## 2024-10-06 RX ADMIN — Medication 10 MILLIGRAM(S): at 06:16

## 2024-10-06 RX ADMIN — Medication 8: at 12:33

## 2024-10-06 RX ADMIN — Medication 6: at 09:05

## 2024-10-06 RX ADMIN — METHYLPREDNISOLONE ACETATE 50 MILLIGRAM(S): 80 INJECTION, SUSPENSION INTRA-ARTICULAR; INTRALESIONAL; INTRAMUSCULAR; SOFT TISSUE at 18:00

## 2024-10-06 RX ADMIN — Medication 4: at 17:56

## 2024-10-06 RX ADMIN — METHYLPREDNISOLONE ACETATE 50 MILLIGRAM(S): 80 INJECTION, SUSPENSION INTRA-ARTICULAR; INTRALESIONAL; INTRAMUSCULAR; SOFT TISSUE at 12:30

## 2024-10-06 RX ADMIN — Medication 2 TABLET(S): at 21:42

## 2024-10-06 RX ADMIN — METHYLPREDNISOLONE ACETATE 50 MILLIGRAM(S): 80 INJECTION, SUSPENSION INTRA-ARTICULAR; INTRALESIONAL; INTRAMUSCULAR; SOFT TISSUE at 04:40

## 2024-10-06 RX ADMIN — Medication 9 UNIT(S): at 17:57

## 2024-10-06 NOTE — PROGRESS NOTE ADULT - PROBLEM SELECTOR PLAN 3
~Cr 2.2 in 2023 per family  - AG 15, BUN 26, Cr 2.29, GFR 32  - Monitor renal function and urine output. Avoid any potential nephrotoxins when possible and dose medications per eGFR. ~Cr 2.2 in 2023 per family  - AG 15, BUN 26, Cr 2.29, GFR 32  -Maybe KIERSTEN on CKD given his Cr worsened to 2.5. Held ACE and Fibrate.   - Monitor renal function and urine output. Avoid any potential nephrotoxins when possible and dose medications per eGFR.

## 2024-10-06 NOTE — PROGRESS NOTE ADULT - PROBLEM SELECTOR PLAN 6
- c/w home med: rosuvastatin 20mg, fenofibrate 160mg - c/w home med: rosuvastatin 20mg  - Held fenofibrate 160mg

## 2024-10-06 NOTE — PROGRESS NOTE ADULT - PROBLEM SELECTOR PLAN 4
- home med: Ozempic 1mg, Farxiga 10mg, Glimepiride 2mg  - Endocrine consulted  - ISS moderate given steroid tx  start Lantus 15  ademelog 5-5-5  moderate dose scale premeals and moderate dose scale bedtime - home med: Ozempic 1mg, Farxiga 10mg, Glimepiride 2mg  - Endocrine consulted, appreciated rec  - Lantus 15, Ademelog 5-5-5, and sliding scale given steroid tx - home med: Ozempic 1mg, Farxiga 10mg, Glimepiride 2mg  - Endocrine consulted, appreciated rec  -  18 lantus and ademelog 9-9-9, and sliding scale given steroid tx

## 2024-10-06 NOTE — PROGRESS NOTE ADULT - ASSESSMENT
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  CT showing b/l symmetric proptosis with optic nerve sheaths appear stretched bilaterally, thyroid ophthalmopathy, severe subconjunctival fat prolapse/herniation bilaterally, large right mastoid effusion -correlate clinically for sterile effusion versus acute otitis media. Admitted for IV steroid tx and further management. Evaluated by ophthalmology and endocrinology.  WBC 7.27, AG 15, BUN 26, Cr 2.29, GFR 32. Free T4 7.76 wnl, T3 81 wnl, TSH 0.17 low, Thyroglobulin 351 high, TPO 16.2.  Patient was started Methylprednisolone IV 1g in ED and on 250mg IV q6. 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  CT showing b/l symmetric proptosis with optic nerve sheaths appear stretched bilaterally, thyroid ophthalmopathy, severe subconjunctival fat prolapse/herniation bilaterally, large right mastoid effusion -correlate clinically for sterile effusion versus acute otitis media. Admitted for IV steroid tx and further management. Evaluated by ophthalmology and endocrinology.  WBC 7.27, AG 15, BUN 26, Cr 2.29, GFR 32. Free T4 7.76 wnl, T3 81 wnl, TSH 0.17 low, Thyroglobulin 351 high, TPO 16.2.  Patient was started Methylprednisolone IV 1g in ED and on 250mg IV q6.  For ?KIERSTEN (wife says Cr baseline 2.2), his Cr worsened to 2.5 today. Held ACE and Fibrate. For DM, patient is on 15 lantus and ademelog 5-5-5.  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  CT showing b/l symmetric proptosis with optic nerve sheaths appear stretched bilaterally, thyroid ophthalmopathy, severe subconjunctival fat prolapse/herniation bilaterally, large right mastoid effusion -correlate clinically for sterile effusion versus acute otitis media. Admitted for IV steroid tx and further management. Evaluated by ophthalmology and endocrinology.  WBC 7.27, AG 15, BUN 26, Cr 2.29, GFR 32. Free T4 7.76 wnl, T3 81 wnl, TSH 0.17 low, Thyroglobulin 351 high, TPO 16.2.  Patient was started Methylprednisolone IV 1g in ED and on 250mg IV q6.  For ?KIERSTEN (wife says Cr baseline 2.2), his Cr worsened to 2.5 today. Held ACE and Fibrate. For DM, patient is on 18 lantus and ademelog 9-9-9

## 2024-10-06 NOTE — PROGRESS NOTE ADULT - PROBLEM SELECTOR PLAN 2
- c/w home med: methimazole 10mg  - Endocrine consulted - c/w home med: methimazole 10mg  - Endocrine consulted, appreciated rec

## 2024-10-06 NOTE — PROGRESS NOTE ADULT - ASSESSMENT
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 Pt with progressive proptosis over the past year.    # Thyroid eye disease  - exam with some mild continued improvement today 10/6/24 VA 20/200 OD, 20/20- OS, color plates 3-4/12, 12/12   - initial exam VA CF OD, 20/30 OS and color plates OD 0/11, OS 8/11  - EOMI  -1 OU and ABduction and supraduction, no pain, no diplopia  - ext exam with severe proptosis with bilateral upper lid ptosis. Periorbital adipose expansion with prolapse and mild resistance to retropulsion   - Continue 250 mg solumedrol IV q6h or 1 g daily for at least 3 days total  - TSHrab +,  (high), TSH 0.17, T3 81, TPO 16.2 (wnl)   - Please obtain labs - quant gold   - CT noncon reviewed - bilaterally symmetric proptosis. severe diffuse enlargement of extraocular muscle bellies bilaterally, with crowding at the orbital apices bilaterally. Optic nerve sheaths appear stretched bilaterally.  - Endo ritesh appreciated   - Had a conversation that visual recovery is uncertain at this time   - Pt will likely need for teprotumumab in the future   - Ophthalmology to follow     RAMSES Piedra, oculoplastics attending

## 2024-10-06 NOTE — PROGRESS NOTE ADULT - PROBLEM SELECTOR PLAN 1
- Ophthalmology consulted - had a conversation that visual recovery is uncertain at this time  - CT showing b/l symmetric proptosis with optic nerve sheaths appear stretched bilaterally, thyroid ophthalmopathy, severe subconjunctival fat prolapse/herniation bilaterally, large right mastoid effusion -correlate clinically for sterile effusion versus acute otitis media  - s/p solumedrol IV 1 g in ED  - c/w solumedrol 250mg q6  - free T4 7.76 wnl, T3 81 wnl, TSH 0.17 low, Thyroglobulin 351 high, TPO 16.2, TSH receptor Ab 7.57 high, TSI   - Pending quant gold   - Endocrine consulted  - Pt will likely need for teprotumumab in the future per ophtho - Ophthalmology consulted - had a conversation that visual recovery is uncertain at this time  - CT showing b/l symmetric proptosis with optic nerve sheaths appear stretched bilaterally, thyroid ophthalmopathy, severe subconjunctival fat prolapse/herniation bilaterally, large right mastoid effusion -correlate clinically for sterile effusion versus acute otitis media  - s/p solumedrol IV 1 g in ED  - c/w solumedrol 250mg q6  - free T4 7.76 wnl, T3 81 wnl, TSH 0.17 low, Thyroglobulin 351 high, TPO 16.2, TSH receptor Ab 7.57 high, TSI   - Pending quant gold result  - Endocrine consulted  - Pt will likely need for teprotumumab in the future per ophtho

## 2024-10-06 NOTE — CHART NOTE - NSCHARTNOTEFT_GEN_A_CORE
Endocrine follow up - [phone]   Patient was not at bedside at time of visit   Discussed case with primary team - Dr Benavidez  See consult note for complete plan of care including discharge planning     Steroid exacerbated Hyperglycemia  Chart reviewed: Steroid exacerbated hyperglycemia noted. Patient on methylPREDNISolone sodium succinate IVPB 250 milliGRAM(s) IV Intermittent every 6 hours. Total daily dose of insulin over the last 24 hours: 45 units.   Will increase pre-meal Admelog to 9 units TID, hold if not eating   Will increase Lantus to 18 units at bedtime   Continue with Admelog correctional scales as ordered below (moderate dosing)    Hyperthyroidism  Thyroid function testing reviewed by Dr Patrick. TSH remains suppressed.  FT4 1.4  Thyroid Stimulating Hormone, Serum: 0.17 uIU/mL (10-04-24 @ 21:41)  Free Thyroxine, Serum: 1.4 ng/dL (10-06-24 @ 07:10)  Continue with Methimazole 10 mg as currently ordered and plan to recheck FT4 in one week (by 10/12)    Endocrine: [phone] for urgent questions or [email or web address] for NON-urgent questions    CAPILLARY BLOOD GLUCOSE      POCT Blood Glucose.: 301 mg/dL (06 Oct 2024 12:25)  POCT Blood Glucose.: 269 mg/dL (06 Oct 2024 08:41)  POCT Blood Glucose.: 328 mg/dL (05 Oct 2024 21:53)  POCT Blood Glucose.: 240 mg/dL (05 Oct 2024 17:26)      10-06    143  |  106  |  46[H]  ----------------------------<  262[H]  3.6   |  20[L]  |  2.52[H]    Ca    9.4      06 Oct 2024 07:10  Phos  4.5     10-06  Mg     2.50     10-06    TPro  7.4  /  Alb  4.4  /  TBili  0.3  /  DBili  x   /  AST  16  /  ALT  15  /  AlkPhos  52  10-06      MEDICATIONS  (STANDING):  amLODIPine   Tablet 10 milliGRAM(s) Oral daily  dextrose 5%. 1000 milliLiter(s) (50 mL/Hr) IV Continuous <Continuous>  dextrose 5%. 1000 milliLiter(s) (100 mL/Hr) IV Continuous <Continuous>  dextrose 50% Injectable 25 Gram(s) IV Push once  dextrose 50% Injectable 25 Gram(s) IV Push once  dextrose 50% Injectable 12.5 Gram(s) IV Push once  dextrose Oral Gel 15 Gram(s) Oral once  glucagon  Injectable 1 milliGRAM(s) IntraMuscular once  influenza   Vaccine 0.5 milliLiter(s) IntraMuscular once  insulin glargine Injectable (LANTUS) 15 Unit(s) SubCutaneous at bedtime  insulin lispro (ADMELOG) corrective regimen sliding scale   SubCutaneous at bedtime  insulin lispro (ADMELOG) corrective regimen sliding scale   SubCutaneous three times a day before meals  insulin lispro Injectable (ADMELOG) 5 Unit(s) SubCutaneous before lunch  insulin lispro Injectable (ADMELOG) 5 Unit(s) SubCutaneous before dinner  methimazole 10 milliGRAM(s) Oral daily  methylPREDNISolone sodium succinate IVPB 250 milliGRAM(s) IV Intermittent every 6 hours  metoprolol succinate ER 50 milliGRAM(s) Oral daily  montelukast 10 milliGRAM(s) Oral daily  rosuvastatin 20 milliGRAM(s) Oral at bedtime  senna 2 Tablet(s) Oral at bedtime      Diet, Regular:   Consistent Carbohydrate {No Snacks} (CSTCHO) (10-05-24 @ 07:53)      A1C with Estimated Average Glucose Result: 7.1 % (10-06-24 @ 07:10)

## 2024-10-06 NOTE — PROGRESS NOTE ADULT - SUBJECTIVE AND OBJECTIVE BOX
[facility] DEPARTMENT OF OPHTHALMOLOGY  ------------------------------------------------------------------------------  Demond Lopes MD PGY-3  Available on teams  ------------------------------------------------------------------------------    Interval History: No acute events overnight.     MEDICATIONS  (STANDING):  amLODIPine   Tablet 10 milliGRAM(s) Oral daily  dextrose 5%. 1000 milliLiter(s) (50 mL/Hr) IV Continuous <Continuous>  dextrose 5%. 1000 milliLiter(s) (100 mL/Hr) IV Continuous <Continuous>  dextrose 50% Injectable 25 Gram(s) IV Push once  dextrose 50% Injectable 25 Gram(s) IV Push once  dextrose 50% Injectable 12.5 Gram(s) IV Push once  dextrose Oral Gel 15 Gram(s) Oral once  fenofibrate Tablet 145 milliGRAM(s) Oral daily  glucagon  Injectable 1 milliGRAM(s) IntraMuscular once  influenza   Vaccine 0.5 milliLiter(s) IntraMuscular once  insulin glargine Injectable (LANTUS) 15 Unit(s) SubCutaneous at bedtime  insulin lispro (ADMELOG) corrective regimen sliding scale   SubCutaneous three times a day before meals  insulin lispro (ADMELOG) corrective regimen sliding scale   SubCutaneous at bedtime  insulin lispro Injectable (ADMELOG) 5 Unit(s) SubCutaneous before dinner  insulin lispro Injectable (ADMELOG) 5 Unit(s) SubCutaneous before lunch  lisinopril 40 milliGRAM(s) Oral daily  methimazole 10 milliGRAM(s) Oral daily  methylPREDNISolone sodium succinate IVPB 250 milliGRAM(s) IV Intermittent every 6 hours  metoprolol succinate ER 50 milliGRAM(s) Oral daily  montelukast 10 milliGRAM(s) Oral daily  rosuvastatin 20 milliGRAM(s) Oral at bedtime  senna 2 Tablet(s) Oral at bedtime    MEDICATIONS  (PRN):  acetaminophen     Tablet .. 650 milliGRAM(s) Oral every 6 hours PRN Temp greater or equal to 38C (100.4F), Mild Pain (1 - 3)  polyethylene glycol 3350 17 Gram(s) Oral daily PRN Constipation      VITALS: T(C): 36.6 (10-06-24 @ 05:25)  T(F): 97.9 (10-06-24 @ 05:25), Max: 98.1 (10-05-24 @ 21:21)  HR: 74 (10-06-24 @ 05:25) (74 - 92)  BP: 139/84 (10-06-24 @ 05:25) (139/84 - 148/82)  RR:  (17 - 18)  SpO2:  (94% - 97%)  Wt(kg): --  General: AAO x 3, appropriate mood and affect    Ophthalmology Exam:  Visual acuity (sc): 20/200 (in supranasal field) OD 20/20 OS  Pupils: +rAPD   Ttono: 14 OU   Extraocular movements (EOMs): -1 OU and ABduction and supraduction, no pain, no diplopia  Confrontational Visual Field (CVF): depressed OD with supranasal sparing, full OS   Color plates: 3-4/12 OD ; OS: 12/12  Pen Light Exam (PLE)  External: severe proptosis with bilateral upper lid ptosis. Periorbital adipose expansion with prolapse and mild resistance to retropulsion   Lids/Lashes/Lacrimal Ducts: Flat OU    Sclera/Conjunctiva: W+Q OU  Cornea: Cl OU  Anterior Chamber: D+F OU    Iris: Flat OU  Lens: NS OU

## 2024-10-06 NOTE — PROGRESS NOTE ADULT - PROBLEM SELECTOR PLAN 5
- c/w home med: Ramipril 10mg (continued as lisinopril 40mg), metoprolol 50mg, Amlodipine 10mg, aspirin 81mg - c/w home med: , metoprolol 50mg, Amlodipine 10mg, aspirin 81mg  - Held Ramipril 10mg (here as lisinopril 40mg)

## 2024-10-06 NOTE — PROGRESS NOTE ADULT - SUBJECTIVE AND OBJECTIVE BOX
PROGRESS NOTE:   Authored by Dr. Gavi Benavidez MD (PGY-1).  via TEAMS    Patient is a 59y old  Male who presents with a chief complaint of thyroid ophthalmopathy. (05 Oct 2024 22:28)      SUBJECTIVE / OVERNIGHT EVENTS:  No acute events overnight.     ADDITIONAL REVIEW OF SYSTEMS:  Patient denies fevers, chills, chest pain, shortness of breath, nausea, abdominal pain, diarrhea, constipation, dysuria, leg swelling, headache, light headedness.    MEDICATIONS  (STANDING):  amLODIPine   Tablet 10 milliGRAM(s) Oral daily  dextrose 5%. 1000 milliLiter(s) (50 mL/Hr) IV Continuous <Continuous>  dextrose 5%. 1000 milliLiter(s) (100 mL/Hr) IV Continuous <Continuous>  dextrose 50% Injectable 25 Gram(s) IV Push once  dextrose 50% Injectable 12.5 Gram(s) IV Push once  dextrose 50% Injectable 25 Gram(s) IV Push once  dextrose Oral Gel 15 Gram(s) Oral once  fenofibrate Tablet 145 milliGRAM(s) Oral daily  glucagon  Injectable 1 milliGRAM(s) IntraMuscular once  influenza   Vaccine 0.5 milliLiter(s) IntraMuscular once  insulin lispro (ADMELOG) corrective regimen sliding scale   SubCutaneous three times a day before meals  insulin lispro (ADMELOG) corrective regimen sliding scale   SubCutaneous at bedtime  lisinopril 40 milliGRAM(s) Oral daily  methimazole 10 milliGRAM(s) Oral daily  methylPREDNISolone sodium succinate IVPB 250 milliGRAM(s) IV Intermittent every 6 hours  metoprolol succinate ER 50 milliGRAM(s) Oral daily  montelukast 10 milliGRAM(s) Oral daily  rosuvastatin 20 milliGRAM(s) Oral at bedtime  senna 2 Tablet(s) Oral at bedtime    MEDICATIONS  (PRN):  acetaminophen     Tablet .. 650 milliGRAM(s) Oral every 6 hours PRN Temp greater or equal to 38C (100.4F), Mild Pain (1 - 3)  polyethylene glycol 3350 17 Gram(s) Oral daily PRN Constipation      CAPILLARY BLOOD GLUCOSE      POCT Blood Glucose.: 328 mg/dL (05 Oct 2024 21:53)  POCT Blood Glucose.: 240 mg/dL (05 Oct 2024 17:26)  POCT Blood Glucose.: 321 mg/dL (05 Oct 2024 12:20)  POCT Blood Glucose.: 267 mg/dL (05 Oct 2024 07:41)    I&O's Summary      PHYSICAL EXAM:  Vital Signs Last 24 Hrs  T(C): 36.6 (06 Oct 2024 05:25), Max: 37.1 (05 Oct 2024 08:08)  T(F): 97.9 (06 Oct 2024 05:25), Max: 98.7 (05 Oct 2024 08:08)  HR: 74 (06 Oct 2024 05:25) (74 - 92)  BP: 139/84 (06 Oct 2024 05:25) (133/83 - 148/82)  BP(mean): --  RR: 18 (06 Oct 2024 05:25) (16 - 18)  SpO2: 94% (06 Oct 2024 05:25) (94% - 100%)    Parameters below as of 05 Oct 2024 21:21  Patient On (Oxygen Delivery Method): room air        CONSTITUTIONAL: NAD  HEENT: Atraumatic, Normocephalic,   Neck: Supple, no elevated JVP. severe proptosis with b/l upper lid ptosis. extraocular movement intact, full eye exam per ophth  RESPIRATORY: Normal respiratory effort; lungs are clear to auscultation bilaterally  CARDIOVASCULAR: Regular rate and rhythm, normal S1 and S2, no murmur/rub/gallop; No lower extremity edema; Peripheral pulses are 2+ bilaterally  ABDOMEN: Nontender to palpation, normoactive bowel sounds, no rebound/guarding; No hepatosplenomegaly  MSK: no clubbing or cyanosis of digits; no joint swelling or tenderness to palpation  NEURO: AOx3, no focal deficits  SKIN: No rashes or lesions    LABS:                        13.1   7.27  )-----------( 242      ( 04 Oct 2024 21:41 )             39.4     10-04    144  |  105  |  26[H]  ----------------------------<  240[H]  3.8   |  24  |  2.29[H]    Ca    10.0      04 Oct 2024 21:41    TPro  8.0  /  Alb  4.8  /  TBili  0.2  /  DBili  x   /  AST  15  /  ALT  17  /  AlkPhos  58  10-04    PT/INR - ( 04 Oct 2024 21:41 )   PT: 11.0 sec;   INR: 0.95 ratio         PTT - ( 04 Oct 2024 21:41 )  PTT:35.0 sec      Urinalysis Basic - ( 04 Oct 2024 21:41 )    Color: x / Appearance: x / SG: x / pH: x  Gluc: 240 mg/dL / Ketone: x  / Bili: x / Urobili: x   Blood: x / Protein: x / Nitrite: x   Leuk Esterase: x / RBC: x / WBC x   Sq Epi: x / Non Sq Epi: x / Bacteria: x          Tele Reviewed:    RADIOLOGY & ADDITIONAL TESTS:  Results Reviewed:   Imaging Personally Reviewed:  Electrocardiogram Personally Reviewed:     PROGRESS NOTE:   Authored by Dr. Gavi Benavidez MD (PGY-1).  via TEAMS    Patient is a 59y old  Male who presents with a chief complaint of thyroid ophthalmopathy. (05 Oct 2024 22:28)      SUBJECTIVE / OVERNIGHT EVENTS:  No acute events overnight. States he feels better overall.    ADDITIONAL REVIEW OF SYSTEMS:  Patient denies fevers, chills, chest pain, shortness of breath, nausea, abdominal pain, diarrhea, constipation, dysuria, leg swelling, headache, light headedness.    MEDICATIONS  (STANDING):  amLODIPine   Tablet 10 milliGRAM(s) Oral daily  dextrose 5%. 1000 milliLiter(s) (50 mL/Hr) IV Continuous <Continuous>  dextrose 5%. 1000 milliLiter(s) (100 mL/Hr) IV Continuous <Continuous>  dextrose 50% Injectable 25 Gram(s) IV Push once  dextrose 50% Injectable 12.5 Gram(s) IV Push once  dextrose 50% Injectable 25 Gram(s) IV Push once  dextrose Oral Gel 15 Gram(s) Oral once  fenofibrate Tablet 145 milliGRAM(s) Oral daily  glucagon  Injectable 1 milliGRAM(s) IntraMuscular once  influenza   Vaccine 0.5 milliLiter(s) IntraMuscular once  insulin lispro (ADMELOG) corrective regimen sliding scale   SubCutaneous three times a day before meals  insulin lispro (ADMELOG) corrective regimen sliding scale   SubCutaneous at bedtime  lisinopril 40 milliGRAM(s) Oral daily  methimazole 10 milliGRAM(s) Oral daily  methylPREDNISolone sodium succinate IVPB 250 milliGRAM(s) IV Intermittent every 6 hours  metoprolol succinate ER 50 milliGRAM(s) Oral daily  montelukast 10 milliGRAM(s) Oral daily  rosuvastatin 20 milliGRAM(s) Oral at bedtime  senna 2 Tablet(s) Oral at bedtime    MEDICATIONS  (PRN):  acetaminophen     Tablet .. 650 milliGRAM(s) Oral every 6 hours PRN Temp greater or equal to 38C (100.4F), Mild Pain (1 - 3)  polyethylene glycol 3350 17 Gram(s) Oral daily PRN Constipation      CAPILLARY BLOOD GLUCOSE      POCT Blood Glucose.: 328 mg/dL (05 Oct 2024 21:53)  POCT Blood Glucose.: 240 mg/dL (05 Oct 2024 17:26)  POCT Blood Glucose.: 321 mg/dL (05 Oct 2024 12:20)  POCT Blood Glucose.: 267 mg/dL (05 Oct 2024 07:41)    I&O's Summary      PHYSICAL EXAM:  Vital Signs Last 24 Hrs  T(C): 36.6 (06 Oct 2024 05:25), Max: 37.1 (05 Oct 2024 08:08)  T(F): 97.9 (06 Oct 2024 05:25), Max: 98.7 (05 Oct 2024 08:08)  HR: 74 (06 Oct 2024 05:25) (74 - 92)  BP: 139/84 (06 Oct 2024 05:25) (133/83 - 148/82)  BP(mean): --  RR: 18 (06 Oct 2024 05:25) (16 - 18)  SpO2: 94% (06 Oct 2024 05:25) (94% - 100%)    Parameters below as of 05 Oct 2024 21:21  Patient On (Oxygen Delivery Method): room air        CONSTITUTIONAL: NAD  HEENT: Atraumatic, Normocephalic,   Neck: Supple, no elevated JVP. severe proptosis with b/l upper lid ptosis. extraocular movement intact, full eye exam per ophth  RESPIRATORY: Normal respiratory effort; lungs are clear to auscultation bilaterally  CARDIOVASCULAR: Regular rate and rhythm, normal S1 and S2, no murmur/rub/gallop; No lower extremity edema; Peripheral pulses are 2+ bilaterally  ABDOMEN: Nontender to palpation, normoactive bowel sounds, no rebound/guarding; No hepatosplenomegaly  MSK: no clubbing or cyanosis of digits; no joint swelling or tenderness to palpation  NEURO: AOx3, no focal deficits  SKIN: No rashes or lesions    LABS:                        13.1   7.27  )-----------( 242      ( 04 Oct 2024 21:41 )             39.4     10-04    144  |  105  |  26[H]  ----------------------------<  240[H]  3.8   |  24  |  2.29[H]    Ca    10.0      04 Oct 2024 21:41    TPro  8.0  /  Alb  4.8  /  TBili  0.2  /  DBili  x   /  AST  15  /  ALT  17  /  AlkPhos  58  10-04    PT/INR - ( 04 Oct 2024 21:41 )   PT: 11.0 sec;   INR: 0.95 ratio         PTT - ( 04 Oct 2024 21:41 )  PTT:35.0 sec      Urinalysis Basic - ( 04 Oct 2024 21:41 )    Color: x / Appearance: x / SG: x / pH: x  Gluc: 240 mg/dL / Ketone: x  / Bili: x / Urobili: x   Blood: x / Protein: x / Nitrite: x   Leuk Esterase: x / RBC: x / WBC x   Sq Epi: x / Non Sq Epi: x / Bacteria: x          Tele Reviewed:    RADIOLOGY & ADDITIONAL TESTS:  Results Reviewed:   Imaging Personally Reviewed:  Electrocardiogram Personally Reviewed:

## 2024-10-07 PROBLEM — E05.00 THYROTOXICOSIS WITH DIFFUSE GOITER WITHOUT THYROTOXIC CRISIS OR STORM: Chronic | Status: ACTIVE | Noted: 2024-10-05

## 2024-10-07 LAB
ALBUMIN SERPL ELPH-MCNC: 4.3 G/DL — SIGNIFICANT CHANGE UP (ref 3.3–5)
ALP SERPL-CCNC: 46 U/L — SIGNIFICANT CHANGE UP (ref 40–120)
ALT FLD-CCNC: 15 U/L — SIGNIFICANT CHANGE UP (ref 4–41)
ANION GAP SERPL CALC-SCNC: 15 MMOL/L — HIGH (ref 7–14)
AST SERPL-CCNC: 21 U/L — SIGNIFICANT CHANGE UP (ref 4–40)
BASOPHILS # BLD AUTO: 0.01 K/UL — SIGNIFICANT CHANGE UP (ref 0–0.2)
BASOPHILS NFR BLD AUTO: 0.1 % — SIGNIFICANT CHANGE UP (ref 0–2)
BILIRUB SERPL-MCNC: 0.3 MG/DL — SIGNIFICANT CHANGE UP (ref 0.2–1.2)
BUN SERPL-MCNC: 44 MG/DL — HIGH (ref 7–23)
CALCIUM SERPL-MCNC: 9.2 MG/DL — SIGNIFICANT CHANGE UP (ref 8.4–10.5)
CHLORIDE SERPL-SCNC: 105 MMOL/L — SIGNIFICANT CHANGE UP (ref 98–107)
CO2 SERPL-SCNC: 23 MMOL/L — SIGNIFICANT CHANGE UP (ref 22–31)
CREAT SERPL-MCNC: 2.02 MG/DL — HIGH (ref 0.5–1.3)
EGFR: 37 ML/MIN/1.73M2 — LOW
EOSINOPHIL # BLD AUTO: 0 K/UL — SIGNIFICANT CHANGE UP (ref 0–0.5)
EOSINOPHIL NFR BLD AUTO: 0 % — SIGNIFICANT CHANGE UP (ref 0–6)
GLUCOSE BLDC GLUCOMTR-MCNC: 235 MG/DL — HIGH (ref 70–99)
GLUCOSE BLDC GLUCOMTR-MCNC: 248 MG/DL — HIGH (ref 70–99)
GLUCOSE BLDC GLUCOMTR-MCNC: 254 MG/DL — HIGH (ref 70–99)
GLUCOSE BLDC GLUCOMTR-MCNC: 292 MG/DL — HIGH (ref 70–99)
GLUCOSE SERPL-MCNC: 241 MG/DL — HIGH (ref 70–99)
HCT VFR BLD CALC: 35.6 % — LOW (ref 39–50)
HGB BLD-MCNC: 11.8 G/DL — LOW (ref 13–17)
IANC: 11.18 K/UL — HIGH (ref 1.8–7.4)
IMM GRANULOCYTES NFR BLD AUTO: 1.3 % — HIGH (ref 0–0.9)
LYMPHOCYTES # BLD AUTO: 1.06 K/UL — SIGNIFICANT CHANGE UP (ref 1–3.3)
LYMPHOCYTES # BLD AUTO: 8.4 % — LOW (ref 13–44)
MAGNESIUM SERPL-MCNC: 2.7 MG/DL — HIGH (ref 1.6–2.6)
MCHC RBC-ENTMCNC: 26 PG — LOW (ref 27–34)
MCHC RBC-ENTMCNC: 33.1 GM/DL — SIGNIFICANT CHANGE UP (ref 32–36)
MCV RBC AUTO: 78.4 FL — LOW (ref 80–100)
MONOCYTES # BLD AUTO: 0.23 K/UL — SIGNIFICANT CHANGE UP (ref 0–0.9)
MONOCYTES NFR BLD AUTO: 1.8 % — LOW (ref 2–14)
NEUTROPHILS # BLD AUTO: 11.18 K/UL — HIGH (ref 1.8–7.4)
NEUTROPHILS NFR BLD AUTO: 88.4 % — HIGH (ref 43–77)
NRBC # BLD: 0 /100 WBCS — SIGNIFICANT CHANGE UP (ref 0–0)
NRBC # FLD: 0 K/UL — SIGNIFICANT CHANGE UP (ref 0–0)
PHOSPHATE SERPL-MCNC: 4.1 MG/DL — SIGNIFICANT CHANGE UP (ref 2.5–4.5)
PLATELET # BLD AUTO: 251 K/UL — SIGNIFICANT CHANGE UP (ref 150–400)
POTASSIUM SERPL-MCNC: 3.6 MMOL/L — SIGNIFICANT CHANGE UP (ref 3.5–5.3)
POTASSIUM SERPL-SCNC: 3.6 MMOL/L — SIGNIFICANT CHANGE UP (ref 3.5–5.3)
PROT SERPL-MCNC: 7 G/DL — SIGNIFICANT CHANGE UP (ref 6–8.3)
RBC # BLD: 4.54 M/UL — SIGNIFICANT CHANGE UP (ref 4.2–5.8)
RBC # FLD: 12.9 % — SIGNIFICANT CHANGE UP (ref 10.3–14.5)
SODIUM SERPL-SCNC: 143 MMOL/L — SIGNIFICANT CHANGE UP (ref 135–145)
WBC # BLD: 12.64 K/UL — HIGH (ref 3.8–10.5)
WBC # FLD AUTO: 12.64 K/UL — HIGH (ref 3.8–10.5)

## 2024-10-07 PROCEDURE — 99232 SBSQ HOSP IP/OBS MODERATE 35: CPT

## 2024-10-07 PROCEDURE — 99232 SBSQ HOSP IP/OBS MODERATE 35: CPT | Mod: GC

## 2024-10-07 RX ORDER — INSULIN GLARGINE 300 U/ML
18 INJECTION, SOLUTION SUBCUTANEOUS
Qty: 1 | Refills: 0
Start: 2024-10-07 | End: 2024-10-13

## 2024-10-07 RX ORDER — INSULIN ASPART INJECTION 100 [IU]/ML
9 INJECTION, SOLUTION SUBCUTANEOUS
Qty: 1 | Refills: 0
Start: 2024-10-07 | End: 2024-10-13

## 2024-10-07 RX ORDER — INSULIN GLARGINE 300 U/ML
10 INJECTION, SOLUTION SUBCUTANEOUS
Qty: 1 | Refills: 0
Start: 2024-10-07 | End: 2024-10-13

## 2024-10-07 RX ORDER — BENZOCAINE .06; .7 ML/1; ML/1
0 SWAB TOPICAL
Qty: 100 | Refills: 1
Start: 2024-10-07

## 2024-10-07 RX ORDER — METHYLPREDNISOLONE ACETATE 80 MG/ML
250 INJECTION, SUSPENSION INTRA-ARTICULAR; INTRALESIONAL; INTRAMUSCULAR; SOFT TISSUE EVERY 6 HOURS
Refills: 0 | Status: DISCONTINUED | OUTPATIENT
Start: 2024-10-07 | End: 2024-10-07

## 2024-10-07 RX ORDER — ALCOHOL ANTISEPTIC PADS
4 PADS, MEDICATED (EA) TOPICAL
Qty: 60 | Refills: 0
Start: 2024-10-07 | End: 2024-10-21

## 2024-10-07 RX ORDER — BISACODYL 5 MG/1
5 TABLET, COATED ORAL EVERY 12 HOURS
Refills: 0 | Status: DISCONTINUED | OUTPATIENT
Start: 2024-10-07 | End: 2024-10-08

## 2024-10-07 RX ADMIN — BISACODYL 5 MILLIGRAM(S): 5 TABLET, COATED ORAL at 18:11

## 2024-10-07 RX ADMIN — Medication 2: at 22:06

## 2024-10-07 RX ADMIN — METHYLPREDNISOLONE ACETATE 50 MILLIGRAM(S): 80 INJECTION, SUSPENSION INTRA-ARTICULAR; INTRALESIONAL; INTRAMUSCULAR; SOFT TISSUE at 05:48

## 2024-10-07 RX ADMIN — MONTELUKAST SODIUM 10 MILLIGRAM(S): 10 TABLET, FILM COATED ORAL at 12:38

## 2024-10-07 RX ADMIN — BISACODYL 5 MILLIGRAM(S): 5 TABLET, COATED ORAL at 12:38

## 2024-10-07 RX ADMIN — Medication 6: at 12:39

## 2024-10-07 RX ADMIN — Medication 9 UNIT(S): at 18:12

## 2024-10-07 RX ADMIN — Medication 4: at 08:45

## 2024-10-07 RX ADMIN — Medication 10 MILLIGRAM(S): at 05:48

## 2024-10-07 RX ADMIN — Medication 50 MILLIGRAM(S): at 22:05

## 2024-10-07 RX ADMIN — ROSUVASTATIN CALCIUM 20 MILLIGRAM(S): 20 TABLET, COATED ORAL at 22:06

## 2024-10-07 RX ADMIN — Medication 9 UNIT(S): at 08:46

## 2024-10-07 RX ADMIN — Medication 9 UNIT(S): at 12:39

## 2024-10-07 RX ADMIN — Medication 17 GRAM(S): at 08:51

## 2024-10-07 RX ADMIN — INSULIN GLARGINE 18 UNIT(S): 300 INJECTION, SOLUTION SUBCUTANEOUS at 22:06

## 2024-10-07 RX ADMIN — Medication 4: at 18:11

## 2024-10-07 NOTE — PROGRESS NOTE ADULT - PROBLEM SELECTOR PLAN 4
- home med: Ozempic 1mg, Farxiga 10mg, Glimepiride 2mg  - Endocrine consulted, appreciated rec  -  18 lantus and ademelog 9-9-9, and sliding scale given steroid tx - home med: Ozempic 1mg, Farxiga 10mg, Glimepiride 2mg  - Endocrine recs appreciated  - c/w Lantus 18u QHS and Admelog 9u pre-meal and sliding scale given steroid tx  - Discharge plan:     - Lantus Solostar pen 18u QHS     - Novolog pen 9u TID AC     - c/w Ozempic 1mg SQ weekly     - stop Janumet, glimepiride - home med: Ozempic 1mg, Farxiga 10mg, Glimepiride 2mg  - Endocrine recs appreciated  - c/w Lantus 18u QHS and Admelog 9u pre-meal and sliding scale given steroid tx  - needs education on insulin pen usage  - Discharge plan:     - Lantus Solostar pen 18u QHS     - Novolog pen 9u TID AC     - c/w Ozempic 1mg SQ weekly     - stop Janumet, glimepiride

## 2024-10-07 NOTE — PROGRESS NOTE ADULT - PROBLEM SELECTOR PLAN 6
- c/w home med: rosuvastatin 20mg  - Held fenofibrate 160mg - c/w home med: rosuvastatin 20mg  - Held fenofibrate 160mg - can consider restarting tmrw given resolution of possible KIERSTEN on CKD Never smoker

## 2024-10-07 NOTE — PROGRESS NOTE ADULT - PROBLEM SELECTOR PLAN 1
- Ophthalmology consulted - had a conversation that visual recovery is uncertain at this time  - CT showing b/l symmetric proptosis with optic nerve sheaths appear stretched bilaterally, thyroid ophthalmopathy, severe subconjunctival fat prolapse/herniation bilaterally, large right mastoid effusion -correlate clinically for sterile effusion versus acute otitis media  - s/p solumedrol IV 1 g in ED  - c/w solumedrol 250mg q6  - free T4 7.76 wnl, T3 81 wnl, TSH 0.17 low, Thyroglobulin 351 high, TPO 16.2, TSH receptor Ab 7.57 high, TSI   - Pending quant gold result  - Endocrine consulted  - Pt will likely need for teprotumumab in the future per ophtho - Ophthalmology consulted - had a conversation that visual recovery is uncertain at this time  - CT showing b/l symmetric proptosis with optic nerve sheaths appear stretched bilaterally, thyroid ophthalmopathy, severe subconjunctival fat prolapse/herniation bilaterally, large right mastoid effusion -correlate clinically for sterile effusion versus acute otitis media  - thyroid U/S (10/6): L thyroid nodule (3.2 x 3.1 x 2.8cm) not significantly changed  - free T4 7.76 wnl, T3 81 wnl, TSH 0.17 low, Thyroglobulin 351 high, TPO 16.2, TSH receptor Ab 7.57 high, TSI pending result  - s/p solumedrol IV 1 g in ED, solumedrol 250mg q6h for 2 days - course finished today (10/7)  - will start tmrw prednisone 80mg PO QD x 3 days, then 60mg PO QD x 3 days - pt has f/u appt w/ ophtho this week  - Pending quant gold result  - Pt will likely need for teprotumumab in the future per ophtho

## 2024-10-07 NOTE — PROGRESS NOTE ADULT - ASSESSMENT
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 Pt with progressive proptosis over the past year.    # Thyroid eye disease  - exam with continued improvement today 10/7/24 VA 20/40-OD, 20/20 OS, color plates 6/12, 12/12   - initial exam VA CF OD, 20/30 OS and color plates OD 0/11, OS 8/11  - EOM with trace abduction deficit OD  - initial ext exam with severe proptosis with bilateral upper lid ptosis. Periorbital adipose expansion with prolapse and mild resistance to retropulsion   - TSHRab elevated,  (high), TSH 0.17, T3 81, TPO 16.2 (wnl)   - Please obtain labs - quant gold   - CT noncon reviewed - bilaterally symmetric proptosis. severe diffuse enlargement of extraocular muscle bellies bilaterally, with crowding at the orbital apices bilaterally. Optic nerve sheaths appear stretched bilaterally.  - Endo recs appreciated   - Had a conversation that visual recovery is uncertain at this time   - Pt will likely need for teprotumumab in the future   - Continue 250 mg solumedrol IV q6h or 1 g daily for at least 3 days total  - After receiving 3g of IV solumedrol, pt is to be discharged on PO pred 80mg daily for 3 days, then 60mg pred PO daily for 3 days  - Pt has an appt with Dr. Piedra later this week  - Ophthalmology to follow     DW Dr Piedra, oculoplastics attending

## 2024-10-07 NOTE — PROGRESS NOTE ADULT - PROBLEM SELECTOR PLAN 3
~Cr 2.2 in 2023 per family  - AG 15, BUN 26, Cr 2.29, GFR 32  -Maybe KIERSTEN on CKD given his Cr worsened to 2.5. Held ACE and Fibrate.   - Monitor renal function and urine output. Avoid any potential nephrotoxins when possible and dose medications per eGFR. ~Cr 2.2 in 2023 per family  - possible KIERSTEN on CKD given his Cr worsened to 2.5 on admission  - Cr 2.02 today - KIERSTEN now resolved  - can consider restarting home ACE and fibrate tmrw  - c/w monitoring renal function and urine output  - Avoid any potential nephrotoxins when possible and dose medications per eGFR.

## 2024-10-07 NOTE — PROGRESS NOTE ADULT - PROBLEM SELECTOR PLAN 2
- c/w home med: methimazole 10mg  - Endocrine consulted, appreciated rec - c/w home med: methimazole 10mg  - Endocrine recs appreciated - will need outpt endo f/u

## 2024-10-07 NOTE — PROGRESS NOTE ADULT - SUBJECTIVE AND OBJECTIVE BOX
Patient is a 59y old  Male who presents with a chief complaint of thyroid ophthalmopathy (06 Oct 2024 09:15)      SUBJECTIVE / OVERNIGHT EVENTS: Patient seen and examined at bedside. No acute events overnight.    MEDICATIONS  (STANDING):  amLODIPine   Tablet 10 milliGRAM(s) Oral daily  dextrose 5%. 1000 milliLiter(s) (50 mL/Hr) IV Continuous <Continuous>  dextrose 5%. 1000 milliLiter(s) (100 mL/Hr) IV Continuous <Continuous>  dextrose 50% Injectable 25 Gram(s) IV Push once  dextrose 50% Injectable 25 Gram(s) IV Push once  dextrose 50% Injectable 12.5 Gram(s) IV Push once  dextrose Oral Gel 15 Gram(s) Oral once  glucagon  Injectable 1 milliGRAM(s) IntraMuscular once  influenza   Vaccine 0.5 milliLiter(s) IntraMuscular once  insulin glargine Injectable (LANTUS) 18 Unit(s) SubCutaneous at bedtime  insulin lispro (ADMELOG) corrective regimen sliding scale   SubCutaneous at bedtime  insulin lispro (ADMELOG) corrective regimen sliding scale   SubCutaneous three times a day before meals  insulin lispro Injectable (ADMELOG) 9 Unit(s) SubCutaneous three times a day before meals  methimazole 10 milliGRAM(s) Oral daily  metoprolol succinate ER 50 milliGRAM(s) Oral daily  montelukast 10 milliGRAM(s) Oral daily  rosuvastatin 20 milliGRAM(s) Oral at bedtime  senna 2 Tablet(s) Oral at bedtime    MEDICATIONS  (PRN):  acetaminophen     Tablet .. 650 milliGRAM(s) Oral every 6 hours PRN Temp greater or equal to 38C (100.4F), Mild Pain (1 - 3)  polyethylene glycol 3350 17 Gram(s) Oral two times a day PRN Constipation      Vital Signs Last 24 Hrs  T(C): 36.7 (07 Oct 2024 05:25), Max: 36.8 (06 Oct 2024 13:06)  T(F): 98.1 (07 Oct 2024 05:25), Max: 98.3 (06 Oct 2024 13:06)  HR: 72 (07 Oct 2024 05:25) (72 - 79)  BP: 142/86 (07 Oct 2024 05:25) (137/73 - 150/77)  BP(mean): --  RR: 18 (07 Oct 2024 05:25) (17 - 18)  SpO2: 96% (07 Oct 2024 05:25) (96% - 100%)    Parameters below as of 07 Oct 2024 05:25  Patient On (Oxygen Delivery Method): room air      CAPILLARY BLOOD GLUCOSE      POCT Blood Glucose.: 251 mg/dL (06 Oct 2024 21:13)  POCT Blood Glucose.: 228 mg/dL (06 Oct 2024 17:43)  POCT Blood Glucose.: 301 mg/dL (06 Oct 2024 12:25)  POCT Blood Glucose.: 269 mg/dL (06 Oct 2024 08:41)    I&O's Summary      PHYSICAL EXAM:  GENERAL: NAD, resting comfortably in bed  HEAD:  Atraumatic, Normocephalic  EYES: EOMI, PERRLA, conjunctiva and sclera clear  NECK: Supple, No JVD  CHEST/LUNG: Clear to auscultation bilaterally; No wheeze  HEART: Regular rate and rhythm; No murmurs, rubs, or gallops  ABDOMEN: Soft, Nontender, Nondistended; Bowel sounds present  EXTREMITIES:  2+ Peripheral Pulses, No clubbing, cyanosis, or edema  PSYCH: AAOx3  NEUROLOGY: non-focal  SKIN: No rashes or lesions    LABS:                        12.2   12.04 )-----------( 245      ( 06 Oct 2024 07:10 )             36.4     10-    143  |  106  |  46[H]  ----------------------------<  262[H]  3.6   |  20[L]  |  2.52[H]    Ca    9.4      06 Oct 2024 07:10  Phos  4.5     10-  Mg     2.50     10-    TPro  7.4  /  Alb  4.4  /  TBili  0.3  /  DBili  x   /  AST  16  /  ALT  15  /  AlkPhos  52  10-06    PT/INR - ( 06 Oct 2024 07:10 )   PT: 11.7 sec;   INR: 1.01 ratio         PTT - ( 06 Oct 2024 07:10 )  PTT:32.6 sec      Urinalysis Basic - ( 06 Oct 2024 13:35 )    Color: Yellow / Appearance: Clear / S.020 / pH: x  Gluc: x / Ketone: Negative mg/dL  / Bili: Negative / Urobili: 0.2 mg/dL   Blood: x / Protein: 100 mg/dL / Nitrite: Negative   Leuk Esterase: Negative / RBC: 0 /HPF / WBC 0 /HPF   Sq Epi: x / Non Sq Epi: 0 /HPF / Bacteria: Negative /HPF        RADIOLOGY & ADDITIONAL TESTS:    Imaging Personally Reviewed:    Consultant(s) Notes Reviewed:      Care Discussed with Consultants/Other Providers:    Rika Cleveland MD, Internal Medicine Resident     Patient is a 59y old  Male who presents with a chief complaint of thyroid ophthalmopathy (06 Oct 2024 09:15)      SUBJECTIVE / OVERNIGHT EVENTS: Patient seen and examined at bedside. No acute events overnight. Pt states that he has been having constipation - currently getting senna at bedtime, encouraged to ask for Miralax as needed. No issues w/ eating or urinating.    MEDICATIONS  (STANDING):  amLODIPine   Tablet 10 milliGRAM(s) Oral daily  dextrose 5%. 1000 milliLiter(s) (50 mL/Hr) IV Continuous <Continuous>  dextrose 5%. 1000 milliLiter(s) (100 mL/Hr) IV Continuous <Continuous>  dextrose 50% Injectable 25 Gram(s) IV Push once  dextrose 50% Injectable 25 Gram(s) IV Push once  dextrose 50% Injectable 12.5 Gram(s) IV Push once  dextrose Oral Gel 15 Gram(s) Oral once  glucagon  Injectable 1 milliGRAM(s) IntraMuscular once  influenza   Vaccine 0.5 milliLiter(s) IntraMuscular once  insulin glargine Injectable (LANTUS) 18 Unit(s) SubCutaneous at bedtime  insulin lispro (ADMELOG) corrective regimen sliding scale   SubCutaneous at bedtime  insulin lispro (ADMELOG) corrective regimen sliding scale   SubCutaneous three times a day before meals  insulin lispro Injectable (ADMELOG) 9 Unit(s) SubCutaneous three times a day before meals  methimazole 10 milliGRAM(s) Oral daily  metoprolol succinate ER 50 milliGRAM(s) Oral daily  montelukast 10 milliGRAM(s) Oral daily  rosuvastatin 20 milliGRAM(s) Oral at bedtime  senna 2 Tablet(s) Oral at bedtime    MEDICATIONS  (PRN):  acetaminophen     Tablet .. 650 milliGRAM(s) Oral every 6 hours PRN Temp greater or equal to 38C (100.4F), Mild Pain (1 - 3)  polyethylene glycol 3350 17 Gram(s) Oral two times a day PRN Constipation      Vital Signs Last 24 Hrs  T(C): 36.7 (07 Oct 2024 05:25), Max: 36.8 (06 Oct 2024 13:06)  T(F): 98.1 (07 Oct 2024 05:25), Max: 98.3 (06 Oct 2024 13:06)  HR: 72 (07 Oct 2024 05:25) (72 - 79)  BP: 142/86 (07 Oct 2024 05:25) (137/73 - 150/77)  BP(mean): --  RR: 18 (07 Oct 2024 05:25) (17 - 18)  SpO2: 96% (07 Oct 2024 05:25) (96% - 100%)    Parameters below as of 07 Oct 2024 05:25  Patient On (Oxygen Delivery Method): room air      CAPILLARY BLOOD GLUCOSE      POCT Blood Glucose.: 251 mg/dL (06 Oct 2024 21:13)  POCT Blood Glucose.: 228 mg/dL (06 Oct 2024 17:43)  POCT Blood Glucose.: 301 mg/dL (06 Oct 2024 12:25)  POCT Blood Glucose.: 269 mg/dL (06 Oct 2024 08:41)    I&O's Summary      PHYSICAL EXAM:  GENERAL: NAD, resting comfortably in bed  HEAD:  Atraumatic, Normocephalic  EYES: Severe proptosis with b/l upper lid ptosis, EOMI, PERRLA, conjunctiva and sclera clear  NECK: Supple, No JVD  CHEST/LUNG: Clear to auscultation bilaterally; No wheeze  HEART: Regular rate and rhythm; No murmurs, rubs, or gallops  ABDOMEN: Soft, Nontender, Nondistended; Bowel sounds present  EXTREMITIES:  2+ Peripheral Pulses, No clubbing, cyanosis, or edema  PSYCH: AAOx3  NEUROLOGY: non-focal  SKIN: No rashes or lesions    LABS:                        12.2   12.04 )-----------( 245      ( 06 Oct 2024 07:10 )             36.4     10-    143  |  106  |  46[H]  ----------------------------<  262[H]  3.6   |  20[L]  |  2.52[H]    Ca    9.4      06 Oct 2024 07:10  Phos  4.5     10-06  Mg     2.50     10-    TPro  7.4  /  Alb  4.4  /  TBili  0.3  /  DBili  x   /  AST  16  /  ALT  15  /  AlkPhos  52  10-06    PT/INR - ( 06 Oct 2024 07:10 )   PT: 11.7 sec;   INR: 1.01 ratio         PTT - ( 06 Oct 2024 07:10 )  PTT:32.6 sec      Urinalysis Basic - ( 06 Oct 2024 13:35 )    Color: Yellow / Appearance: Clear / S.020 / pH: x  Gluc: x / Ketone: Negative mg/dL  / Bili: Negative / Urobili: 0.2 mg/dL   Blood: x / Protein: 100 mg/dL / Nitrite: Negative   Leuk Esterase: Negative / RBC: 0 /HPF / WBC 0 /HPF   Sq Epi: x / Non Sq Epi: 0 /HPF / Bacteria: Negative /HPF        RADIOLOGY & ADDITIONAL TESTS:    Imaging Personally Reviewed:    Consultant(s) Notes Reviewed:      Care Discussed with Consultants/Other Providers:    Rika Cleveland MD, Internal Medicine Resident

## 2024-10-07 NOTE — PROGRESS NOTE ADULT - ASSESSMENT
59 M with pmhx of Graves disease, HTN, HLD, T2DM, and CKD was sent in by [facility] outpatient ophthalmology after found to have bilateral thyroid eye disease with bilateral optic nerve atrophy for IV steroid treatment on 10/4/24.  CT showing b/l symmetric proptosis with optic nerve sheaths appear stretched bilaterally, thyroid ophthalmopathy, severe subconjunctival fat prolapse/herniation bilaterally, large right mastoid effusion -correlate clinically for sterile effusion versus acute otitis media. Admitted for IV steroid tx and further management. Evaluated by ophthalmology and endocrinology.  WBC 7.27, AG 15, BUN 26, Cr 2.29, GFR 32. Free T4 7.76 wnl, T3 81 wnl, TSH 0.17 low, Thyroglobulin 351 high, TPO 16.2.  Patient was started Methylprednisolone IV 1g in ED and on 250mg IV q6.  For ?KIERSTEN (wife says Cr baseline 2.2), his Cr worsened to 2.5 today. Held ACE and Fibrate. For DM, patient is on 18 lantus and ademelog 9-9-9  59 M with pmhx of Graves disease, HTN, HLD, T2DM, and CKD was sent in by [facility] outpatient ophthalmology after found to have bilateral thyroid eye disease with bilateral optic nerve atrophy for IV steroid treatment on 10/4/24.  CT showing b/l symmetric proptosis with optic nerve sheaths appear stretched bilaterally, thyroid ophthalmopathy, severe subconjunctival fat prolapse/herniation bilaterally, large right mastoid effusion -correlate clinically for sterile effusion versus acute otitis media. Admitted for IV steroid tx and further management. Evaluated by ophthalmology and endocrinology.  WBC 7.27, AG 15, BUN 26, Cr 2.29, GFR 32. Free T4 7.76 wnl, T3 81 wnl, TSH 0.17 low, Thyroglobulin 351 high, TPO 16.2.  Patient was started Methylprednisolone IV 1g in ED and on 250mg IV q6h - 3 day total course finished today.  KIERSTEN on CKD now resolved. For DM, patient is on 18 lantus and ademelog 9-9-9

## 2024-10-07 NOTE — PROGRESS NOTE ADULT - ASSESSMENT
59M with pmhx of Graves disease was sent in by [facility] outpatient ophthalmology after found to have bilateral thyroid eye disease with bilateral optic nerve atrophy for IV steroid treatment on 10/4/2.  Endocrine called for hyperthyroidism      Hyperthyroidism    - with longstanding hyperthyroidism on methimzole 10mg X15 years complicated by thyroid eye disease  - TSHrab +7.57 --> consistent with graves   - US thyroid - Left thyroid nodule, not significantly changed and correspond to the previously biopsied benign nodule.  - send TSI ---> pending, specimen received   - TSH 0.17 total T4 WNL  - Free T4 1.4 WNL  - c/w methimazole 10mg for now   - HR well controlled no role of Beta blocker for hyperthyroidism currently   - steroids and Tx for MAULIK (thyroid eye disease) as per ophtho     Discharge plan   - needs outpatient endocrine follow up  - can discuss surgery as potential tx to help reduce antibody burden   - ophtho f/u to discuss Tepazza as well           DM type 2  - a1c 7.1  - Home medication: Ozempic 1 mg weekly, last dose September 30 + glimepiride 2 mg daily + Farxiga 10 mg daily + Janumet  mg.  Per patient metformin was discontinued due to renal dysfunction.  - Endocrinologist: Used to follow-up with an endocrinologist but stopped.  Now follows up with PCP.  Patient is interested in following up in our clinic.  - Patient does not regularly check his blood glucose.       Inpatient plan   - FS goal 100-180 mg/dl   - Fingersticks above goal  - Patient received methylprednisolone 250 mg every 6 hours, last dose at 5 AM today.  - Per primary team, starting tomorrow, plan for prednisone 80 mg daily for 3 days then 60 mg daily for 3 days then stop.   - Despite glucose being above goal, patient with significant dose reduction of steroids, will not increase insulin doses and continue on current regimen  - Continue Lantus 18 units at bedtime  - Continue Admelog 9 units TID AC. Hold if not eating/NPO.   - continue moderate Admelog correctional scale TID AC  - continue separate moderate Admelog correctional scale at HS   - FS TID AC & HS ---> q6 if NPO   - hypoglycemia protocol PRN   - consistent carbohydrate diet  - RD consult  - provider to RN - Insulin pen teaching      Discharge plan  - Given age and renal dysfunction, would stop glimepiride.  - Stop Janumet as patient is already on Ozempic.  - Patient will be discharged on steroids, will do basal/bolus insulin on discharge + Ozempic 1 mg subQ weekly + Farxiga 10 mg daily  - Please send prescription for Lantus Solostar pen 18 units at bedtime + NovoLog pen 9 units TID AC. Hold Novolog if not eating. Will adjust dose on the day of discharge if needed.  - please send prescription for glucometer and supplies - test strip, lancets, alcohol pads and insulin pen needles.   - Please send prescription for Glucose tablets 4G (take 4 tablets) or 15G tablets for blood sugar less than 70 mG/dL, repeat fingerstick in 15 minutes.  - Patient to call [facility] at [place]:   [address]  [place], [place] [id number] [phone]   (office made aware to call the patient for appointment on 10/07/2024)        HTN  - goal BP in DM <130/80  - outpt mc/cr ratio  - Continue metoprolol, Norvasc  - management per primary team         HLD  - LDL goal <70   - Continue Crestor  - Check lipid panel if not done recently  - management per primary team           Tarik Cabrales, FEDERICOP-BC  Nurse Practitioner  Division of Endocrinology & Diabetes  pager [phone]
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
## 2024-10-07 NOTE — PROGRESS NOTE ADULT - SUBJECTIVE AND OBJECTIVE BOX
Chief Complaint: DM type 2     Interval Events: Family at bedside.  Fingersticks above goal.  Patient received a total of 1000 mg Solu-Medrol in the last 24 hours; last dose was 250 mg this morning at 5 AM. No N/V or abdominal pain.     MEDICATIONS  (STANDING):  amLODIPine   Tablet 10 milliGRAM(s) Oral daily  bisacodyl 5 milliGRAM(s) Oral every 12 hours  dextrose 5%. 1000 milliLiter(s) (50 mL/Hr) IV Continuous <Continuous>  dextrose 5%. 1000 milliLiter(s) (100 mL/Hr) IV Continuous <Continuous>  dextrose 50% Injectable 12.5 Gram(s) IV Push once  dextrose 50% Injectable 25 Gram(s) IV Push once  dextrose 50% Injectable 25 Gram(s) IV Push once  dextrose Oral Gel 15 Gram(s) Oral once  glucagon  Injectable 1 milliGRAM(s) IntraMuscular once  influenza   Vaccine 0.5 milliLiter(s) IntraMuscular once  insulin glargine Injectable (LANTUS) 18 Unit(s) SubCutaneous at bedtime  insulin lispro (ADMELOG) corrective regimen sliding scale   SubCutaneous at bedtime  insulin lispro (ADMELOG) corrective regimen sliding scale   SubCutaneous three times a day before meals  insulin lispro Injectable (ADMELOG) 9 Unit(s) SubCutaneous three times a day before meals  methimazole 10 milliGRAM(s) Oral daily  metoprolol succinate ER 50 milliGRAM(s) Oral daily  montelukast 10 milliGRAM(s) Oral daily  rosuvastatin 20 milliGRAM(s) Oral at bedtime  senna 2 Tablet(s) Oral at bedtime    MEDICATIONS  (PRN):  acetaminophen     Tablet .. 650 milliGRAM(s) Oral every 6 hours PRN Temp greater or equal to 38C (100.4F), Mild Pain (1 - 3)  polyethylene glycol 3350 17 Gram(s) Oral two times a day PRN Constipation      Allergies  penicillin (Hives)    Review of Systems:  Constitutional: No fever/chills   Cardiovascular: No chest pain, no palpitations  Respiratory: No SOB, no cough  GI: No nausea, vomiting or abdominal pain  : No dysuria  Endocrine: no polyuria, polydipsia      VITALS: T(C): 36.7 (10-07-24 @ 12:58)  T(F): 98 (10-07-24 @ 12:58), Max: 98.1 (10-07-24 @ 05:25)  HR: 78 (10-07-24 @ 12:58) (72 - 79)  BP: 134/70 (10-07-24 @ 12:58) (134/70 - 150/77)  RR:  (17 - 18)  SpO2:  (96% - 97%)      Physical Exam:   GENERAL: NAD, well-groomed, well-developed  RESPIRATORY: non labored breathing   GI: Soft, nontender, non distended  SKIN: Dry, intact, No rashes or lesions  MUSCULOSKELETAL: Full range of motion, normal strength  NEURO: A&Ox3      CAPILLARY BLOOD GLUCOSE  POCT Blood Glucose.: 292 mg/dL (07 Oct 2024 12:02)  POCT Blood Glucose.: 235 mg/dL (07 Oct 2024 08:39)  POCT Blood Glucose.: 251 mg/dL (06 Oct 2024 21:13)  POCT Blood Glucose.: 228 mg/dL (06 Oct 2024 17:43)      10-07    143  |  105  |  44[H]  ----------------------------<  241[H]  3.6   |  23  |  2.02[H]    eGFR: 37[L]    Ca    9.2      10-07  Mg     2.70     10-07  Phos  4.1     10-07    TPro  7.0  /  Alb  4.3  /  TBili  0.3  /  DBili  x   /  AST  21  /  ALT  15  /  AlkPhos  46  10-07      A1C with Estimated Average Glucose Result: 7.1 % (10-06-24 @ 07:10)      Thyroid Function Tests:  10-06 @ 07:10 TSH -- FreeT4 1.4 T3 -- Anti TPO -- Anti Thyroglobulin Ab -- TSI --  10-04 @ 21:41 TSH 0.17 FreeT4 -- T3 81 Anti TPO 16.2 Anti Thyroglobulin Ab 17.0 TSI --

## 2024-10-07 NOTE — PHYSICAL THERAPY INITIAL EVALUATION ADULT - PERTINENT HX OF CURRENT PROBLEM, REHAB EVAL
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

## 2024-10-07 NOTE — PROGRESS NOTE ADULT - PROBLEM SELECTOR PLAN 5
- c/w home med: , metoprolol 50mg, Amlodipine 10mg, aspirin 81mg  - Held Ramipril 10mg (here as lisinopril 40mg) - c/w home med: metoprolol 50mg, Amlodipine 10mg, aspirin 81mg  - Held Ramipril 10mg (here as lisinopril 40mg)  - given resolution of possible KIERSTEN on CKD, can consider resuming Ramipril tmrw

## 2024-10-07 NOTE — PROGRESS NOTE ADULT - TIME BILLING
Time spent on review of vitals, physical exam, documentation, and discussion of plan of care with patient and patient family.
Time spent on review of vitals, physical exam, documentation, and discussion of plan of care with patient and patient family.

## 2024-10-07 NOTE — PHYSICAL THERAPY INITIAL EVALUATION ADULT - ADDITIONAL COMMENTS
Pt lives with his wife in a house with 2-3 stairs to enter; Pt resides on the first floor. prior to admission Pt was independent with all mobility and ambulated without an assistive device.     Pt. left comfortable sitting edge of bed call bell in reach and in NAD.

## 2024-10-08 ENCOUNTER — TRANSCRIPTION ENCOUNTER (OUTPATIENT)
Age: 59
End: 2024-10-08

## 2024-10-08 VITALS
HEART RATE: 68 BPM | DIASTOLIC BLOOD PRESSURE: 76 MMHG | SYSTOLIC BLOOD PRESSURE: 146 MMHG | OXYGEN SATURATION: 96 % | RESPIRATION RATE: 18 BRPM | TEMPERATURE: 98 F

## 2024-10-08 LAB
ALBUMIN SERPL ELPH-MCNC: 4 G/DL — SIGNIFICANT CHANGE UP (ref 3.3–5)
ALP SERPL-CCNC: 42 U/L — SIGNIFICANT CHANGE UP (ref 40–120)
ALT FLD-CCNC: 16 U/L — SIGNIFICANT CHANGE UP (ref 4–41)
ANION GAP SERPL CALC-SCNC: 11 MMOL/L — SIGNIFICANT CHANGE UP (ref 7–14)
AST SERPL-CCNC: 18 U/L — SIGNIFICANT CHANGE UP (ref 4–40)
BASOPHILS # BLD AUTO: 0.02 K/UL — SIGNIFICANT CHANGE UP (ref 0–0.2)
BASOPHILS NFR BLD AUTO: 0.2 % — SIGNIFICANT CHANGE UP (ref 0–2)
BILIRUB SERPL-MCNC: 0.2 MG/DL — SIGNIFICANT CHANGE UP (ref 0.2–1.2)
BUN SERPL-MCNC: 44 MG/DL — HIGH (ref 7–23)
CALCIUM SERPL-MCNC: 8.3 MG/DL — LOW (ref 8.4–10.5)
CHLORIDE SERPL-SCNC: 106 MMOL/L — SIGNIFICANT CHANGE UP (ref 98–107)
CO2 SERPL-SCNC: 25 MMOL/L — SIGNIFICANT CHANGE UP (ref 22–31)
CREAT SERPL-MCNC: 2.08 MG/DL — HIGH (ref 0.5–1.3)
EGFR: 36 ML/MIN/1.73M2 — LOW
EOSINOPHIL # BLD AUTO: 0.01 K/UL — SIGNIFICANT CHANGE UP (ref 0–0.5)
EOSINOPHIL NFR BLD AUTO: 0.1 % — SIGNIFICANT CHANGE UP (ref 0–6)
GLUCOSE BLDC GLUCOMTR-MCNC: 124 MG/DL — HIGH (ref 70–99)
GLUCOSE BLDC GLUCOMTR-MCNC: 144 MG/DL — HIGH (ref 70–99)
GLUCOSE BLDC GLUCOMTR-MCNC: 250 MG/DL — HIGH (ref 70–99)
GLUCOSE SERPL-MCNC: 142 MG/DL — HIGH (ref 70–99)
HCT VFR BLD CALC: 35 % — LOW (ref 39–50)
HGB BLD-MCNC: 11.6 G/DL — LOW (ref 13–17)
IANC: 8.55 K/UL — HIGH (ref 1.8–7.4)
IMM GRANULOCYTES NFR BLD AUTO: 0.9 % — SIGNIFICANT CHANGE UP (ref 0–0.9)
LYMPHOCYTES # BLD AUTO: 1.27 K/UL — SIGNIFICANT CHANGE UP (ref 1–3.3)
LYMPHOCYTES # BLD AUTO: 11.9 % — LOW (ref 13–44)
MAGNESIUM SERPL-MCNC: 2.8 MG/DL — HIGH (ref 1.6–2.6)
MCHC RBC-ENTMCNC: 26.1 PG — LOW (ref 27–34)
MCHC RBC-ENTMCNC: 33.1 GM/DL — SIGNIFICANT CHANGE UP (ref 32–36)
MCV RBC AUTO: 78.7 FL — LOW (ref 80–100)
MONOCYTES # BLD AUTO: 0.76 K/UL — SIGNIFICANT CHANGE UP (ref 0–0.9)
MONOCYTES NFR BLD AUTO: 7.1 % — SIGNIFICANT CHANGE UP (ref 2–14)
NEUTROPHILS # BLD AUTO: 8.55 K/UL — HIGH (ref 1.8–7.4)
NEUTROPHILS NFR BLD AUTO: 79.8 % — HIGH (ref 43–77)
NRBC # BLD: 0 /100 WBCS — SIGNIFICANT CHANGE UP (ref 0–0)
NRBC # FLD: 0 K/UL — SIGNIFICANT CHANGE UP (ref 0–0)
PHOSPHATE SERPL-MCNC: 3.2 MG/DL — SIGNIFICANT CHANGE UP (ref 2.5–4.5)
PLATELET # BLD AUTO: 225 K/UL — SIGNIFICANT CHANGE UP (ref 150–400)
POTASSIUM SERPL-MCNC: 3.7 MMOL/L — SIGNIFICANT CHANGE UP (ref 3.5–5.3)
POTASSIUM SERPL-SCNC: 3.7 MMOL/L — SIGNIFICANT CHANGE UP (ref 3.5–5.3)
PROT SERPL-MCNC: 6.3 G/DL — SIGNIFICANT CHANGE UP (ref 6–8.3)
RBC # BLD: 4.45 M/UL — SIGNIFICANT CHANGE UP (ref 4.2–5.8)
RBC # FLD: 13.1 % — SIGNIFICANT CHANGE UP (ref 10.3–14.5)
SODIUM SERPL-SCNC: 142 MMOL/L — SIGNIFICANT CHANGE UP (ref 135–145)
TSI ACT/NOR SER: 6.3 IU/L — HIGH (ref 0–0.55)
WBC # BLD: 10.71 K/UL — HIGH (ref 3.8–10.5)
WBC # FLD AUTO: 10.71 K/UL — HIGH (ref 3.8–10.5)

## 2024-10-08 PROCEDURE — 99232 SBSQ HOSP IP/OBS MODERATE 35: CPT

## 2024-10-08 PROCEDURE — 99239 HOSP IP/OBS DSCHRG MGMT >30: CPT | Mod: GC

## 2024-10-08 RX ORDER — INSULIN GLARGINE 300 U/ML
14 INJECTION, SOLUTION SUBCUTANEOUS
Qty: 1 | Refills: 0
Start: 2024-10-08 | End: 2024-11-06

## 2024-10-08 RX ORDER — INSULIN LISPRO 100/ML
9 VIAL (ML) SUBCUTANEOUS
Qty: 2 | Refills: 0
Start: 2024-10-08 | End: 2024-10-22

## 2024-10-08 RX ORDER — METHIMAZOLE 5 MG/1
1 TABLET ORAL
Qty: 0 | Refills: 0 | DISCHARGE
Start: 2024-10-08

## 2024-10-08 RX ORDER — BENZOCAINE .06; .7 ML/1; ML/1
0 SWAB TOPICAL
Qty: 100 | Refills: 1
Start: 2024-10-08

## 2024-10-08 RX ORDER — INSULIN LISPRO 100/ML
7 VIAL (ML) SUBCUTANEOUS
Qty: 2 | Refills: 0
Start: 2024-10-08 | End: 2024-10-22

## 2024-10-08 RX ORDER — PREDNISONE 5 MG/1
4 TABLET ORAL
Qty: 17 | Refills: 0
Start: 2024-10-08 | End: 2024-10-12

## 2024-10-08 RX ORDER — INSULIN GLARGINE 300 U/ML
18 INJECTION, SOLUTION SUBCUTANEOUS
Qty: 1 | Refills: 0
Start: 2024-10-08 | End: 2024-10-22

## 2024-10-08 RX ORDER — GLIMEPIRIDE 1 MG/1
1 TABLET ORAL
Refills: 0 | DISCHARGE

## 2024-10-08 RX ORDER — METHIMAZOLE 5 MG/1
1 TABLET ORAL
Refills: 0 | DISCHARGE

## 2024-10-08 RX ORDER — DAPAGLIFLOZIN 10 MG/1
1 TABLET, FILM COATED ORAL
Qty: 30 | Refills: 0
Start: 2024-10-08 | End: 2024-11-06

## 2024-10-08 RX ORDER — DAPAGLIFLOZIN 10 MG/1
1 TABLET, FILM COATED ORAL
Refills: 0 | DISCHARGE

## 2024-10-08 RX ORDER — ALCOHOL ANTISEPTIC PADS
4 PADS, MEDICATED (EA) TOPICAL
Qty: 60 | Refills: 0
Start: 2024-10-08 | End: 2024-10-22

## 2024-10-08 RX ORDER — PREDNISONE 5 MG/1
80 TABLET ORAL EVERY 24 HOURS
Refills: 0 | Status: DISCONTINUED | OUTPATIENT
Start: 2024-10-08 | End: 2024-10-08

## 2024-10-08 RX ORDER — PANTOPRAZOLE SODIUM 40 MG/1
1 TABLET, DELAYED RELEASE ORAL
Qty: 30 | Refills: 0
Start: 2024-10-08 | End: 2024-11-06

## 2024-10-08 RX ORDER — ALCOHOL ANTISEPTIC PADS
1 PADS, MEDICATED (EA) TOPICAL
Qty: 1 | Refills: 0
Start: 2024-10-08

## 2024-10-08 RX ADMIN — Medication 9 UNIT(S): at 17:54

## 2024-10-08 RX ADMIN — PREDNISONE 80 MILLIGRAM(S): 5 TABLET ORAL at 11:21

## 2024-10-08 RX ADMIN — Medication 9 UNIT(S): at 09:01

## 2024-10-08 RX ADMIN — Medication 10 MILLIGRAM(S): at 05:12

## 2024-10-08 RX ADMIN — Medication 4: at 17:53

## 2024-10-08 RX ADMIN — MONTELUKAST SODIUM 10 MILLIGRAM(S): 10 TABLET, FILM COATED ORAL at 11:22

## 2024-10-08 RX ADMIN — Medication 9 UNIT(S): at 12:58

## 2024-10-08 RX ADMIN — BISACODYL 5 MILLIGRAM(S): 5 TABLET, COATED ORAL at 17:53

## 2024-10-08 NOTE — PROGRESS NOTE ADULT - SUBJECTIVE AND OBJECTIVE BOX
[facility] DEPARTMENT OF OPHTHALMOLOGY  ------------------------------------------------------------------------------  Akhil Warren MD PGY 3  Available on Teams  ------------------------------------------------------------------------------    Interval History: No acute events overnight. Today patient denying blurred vision, eye pain, flashes, floaters, FBS, erythema, or discharge.     MEDICATIONS  (STANDING):  amLODIPine   Tablet 10 milliGRAM(s) Oral daily  bisacodyl 5 milliGRAM(s) Oral every 12 hours  dextrose 5%. 1000 milliLiter(s) (50 mL/Hr) IV Continuous <Continuous>  dextrose 5%. 1000 milliLiter(s) (100 mL/Hr) IV Continuous <Continuous>  dextrose 50% Injectable 25 Gram(s) IV Push once  dextrose 50% Injectable 12.5 Gram(s) IV Push once  dextrose 50% Injectable 25 Gram(s) IV Push once  dextrose Oral Gel 15 Gram(s) Oral once  glucagon  Injectable 1 milliGRAM(s) IntraMuscular once  influenza   Vaccine 0.5 milliLiter(s) IntraMuscular once  insulin glargine Injectable (LANTUS) 18 Unit(s) SubCutaneous at bedtime  insulin lispro (ADMELOG) corrective regimen sliding scale   SubCutaneous three times a day before meals  insulin lispro (ADMELOG) corrective regimen sliding scale   SubCutaneous at bedtime  insulin lispro Injectable (ADMELOG) 9 Unit(s) SubCutaneous three times a day before meals  methimazole 10 milliGRAM(s) Oral daily  metoprolol succinate ER 50 milliGRAM(s) Oral daily  montelukast 10 milliGRAM(s) Oral daily  predniSONE   Tablet 80 milliGRAM(s) Oral every 24 hours  rosuvastatin 20 milliGRAM(s) Oral at bedtime  senna 2 Tablet(s) Oral at bedtime    MEDICATIONS  (PRN):  acetaminophen     Tablet .. 650 milliGRAM(s) Oral every 6 hours PRN Temp greater or equal to 38C (100.4F), Mild Pain (1 - 3)  polyethylene glycol 3350 17 Gram(s) Oral two times a day PRN Constipation      VITALS: T(C): 36.7 (10-08-24 @ 05:07)  T(F): 98 (10-08-24 @ 05:07), Max: 98.1 (10-07-24 @ 22:00)  HR: 64 (10-08-24 @ 05:07) (64 - 78)  BP: 119/68 (10-08-24 @ 05:07) (119/68 - 156/87)  RR:  (17 - 18)  SpO2:  (96% - 97%)  Wt(kg): --  General: AAO x 3, appropriate mood and affect    Ophthalmology Exam:  Visual acuity (cc): 20/25- OD, 20/20 OS  Pupils: Trace APD OD  Ttono: 18 OD, 21 OS  Extraocular movements (EOMs): -1 abduction deficit OD, Full OS  Confrontational Visual Field (CVF): Full OU  Color plates: 8/12 OD, 12/12 OS    Pen Light Exam (PLE)  External: improving proptosis with bilateral upper lid ptosis. Periorbital adipose expansion with prolapse and mild resistance to retropulsion   Lids/Lashes/Lacrimal Ducts: Flat OU    Sclera/Conjunctiva: injected OU  Cornea: Cl OU  Anterior Chamber: D+F OU    Iris: Flat OU  Lens: NS OU

## 2024-10-08 NOTE — PROGRESS NOTE ADULT - PROBLEM SELECTOR PLAN 4
- home med: Ozempic 1mg, Farxiga 10mg, Glimepiride 2mg  - Endocrine recs appreciated  - c/w Lantus 18u QHS and Admelog 9u pre-meal and sliding scale given steroid tx  - needs education on insulin pen usage  - Discharge plan:     - Lantus Solostar pen 18u QHS     - Novolog pen 9u TID AC     - c/w Ozempic 1mg SQ weekly     - stop Janumet, glimepiride - home med: Ozempic 1mg, Farxiga 10mg, Glimepiride 2mg  - Endocrine recs appreciated  - c/w Lantus 18u QHS and Admelog 9u pre-meal and sliding scale given steroid tx  - education on insulin pen usage was given yesterday  - Discharge plan:     - Lantus Solostar pen 18u QHS -> as per pt's insurance, Semglee     - Novolog pen 9u TID AC -> as per pt's insurance, Humalog     - c/w Ozempic 1mg SQ weekly     - stop Janumet, glimepiride     - needs glucometer, lancets, test strips, alcohol swabs - home med: Ozempic 1mg, Farxiga 10mg, Glimepiride 2mg  - Endocrine recs appreciated  - c/w Lantus 18u QHS and Admelog 9u pre-meal and sliding scale given steroid tx  - education on insulin pen usage was given yesterday  - Discharge plan:     - Semglee pen 14u QHS     - Humalog pen 7u TID AC while on prednisone 80mg, 5u TID AC while on prednisone 60mg     - Humalog should be stopped when pt is off steroids     - c/w Ozempic 1mg SQ weekly     - start Farxiga 10mg QD     - stop Janumet, glimepiride     - needs glucometer, lancets, test strips, alcohol swabs

## 2024-10-08 NOTE — PROGRESS NOTE ADULT - SUBJECTIVE AND OBJECTIVE BOX
Patient is a 59y old  Male who presents with a chief complaint of thyroid ophthalmopathy. (07 Oct 2024 13:38)      SUBJECTIVE / OVERNIGHT EVENTS: Patient seen and examined at bedside. No acute events overnight.    MEDICATIONS  (STANDING):  amLODIPine   Tablet 10 milliGRAM(s) Oral daily  bisacodyl 5 milliGRAM(s) Oral every 12 hours  dextrose 5%. 1000 milliLiter(s) (100 mL/Hr) IV Continuous <Continuous>  dextrose 5%. 1000 milliLiter(s) (50 mL/Hr) IV Continuous <Continuous>  dextrose 50% Injectable 12.5 Gram(s) IV Push once  dextrose 50% Injectable 25 Gram(s) IV Push once  dextrose 50% Injectable 25 Gram(s) IV Push once  dextrose Oral Gel 15 Gram(s) Oral once  glucagon  Injectable 1 milliGRAM(s) IntraMuscular once  influenza   Vaccine 0.5 milliLiter(s) IntraMuscular once  insulin glargine Injectable (LANTUS) 18 Unit(s) SubCutaneous at bedtime  insulin lispro (ADMELOG) corrective regimen sliding scale   SubCutaneous three times a day before meals  insulin lispro (ADMELOG) corrective regimen sliding scale   SubCutaneous at bedtime  insulin lispro Injectable (ADMELOG) 9 Unit(s) SubCutaneous three times a day before meals  methimazole 10 milliGRAM(s) Oral daily  metoprolol succinate ER 50 milliGRAM(s) Oral daily  montelukast 10 milliGRAM(s) Oral daily  predniSONE   Tablet 80 milliGRAM(s) Oral daily  rosuvastatin 20 milliGRAM(s) Oral at bedtime  senna 2 Tablet(s) Oral at bedtime    MEDICATIONS  (PRN):  acetaminophen     Tablet .. 650 milliGRAM(s) Oral every 6 hours PRN Temp greater or equal to 38C (100.4F), Mild Pain (1 - 3)  polyethylene glycol 3350 17 Gram(s) Oral two times a day PRN Constipation      Vital Signs Last 24 Hrs  T(C): 36.7 (08 Oct 2024 05:07), Max: 36.7 (07 Oct 2024 12:58)  T(F): 98 (08 Oct 2024 05:07), Max: 98.1 (07 Oct 2024 22:00)  HR: 64 (08 Oct 2024 05:07) (64 - 78)  BP: 119/68 (08 Oct 2024 05:07) (119/68 - 156/87)  BP(mean): --  RR: 18 (08 Oct 2024 05:07) (17 - 18)  SpO2: 96% (08 Oct 2024 05:07) (96% - 97%)    Parameters below as of 08 Oct 2024 05:07  Patient On (Oxygen Delivery Method): room air      CAPILLARY BLOOD GLUCOSE      POCT Blood Glucose.: 254 mg/dL (07 Oct 2024 21:55)  POCT Blood Glucose.: 248 mg/dL (07 Oct 2024 18:10)  POCT Blood Glucose.: 292 mg/dL (07 Oct 2024 12:02)  POCT Blood Glucose.: 235 mg/dL (07 Oct 2024 08:39)    I&O's Summary      PHYSICAL EXAM:  GENERAL: NAD, resting comfortably in bed  HEAD:  Atraumatic, Normocephalic  EYES: EOMI, PERRLA, conjunctiva and sclera clear  NECK: Supple, No JVD  CHEST/LUNG: Clear to auscultation bilaterally; No wheeze  HEART: Regular rate and rhythm; No murmurs, rubs, or gallops  ABDOMEN: Soft, Nontender, Nondistended; Bowel sounds present  EXTREMITIES:  2+ Peripheral Pulses, No clubbing, cyanosis, or edema  PSYCH: AAOx3  NEUROLOGY: non-focal  SKIN: No rashes or lesions    LABS:                        11.8   12.64 )-----------( 251      ( 07 Oct 2024 06:15 )             35.6     10-07    143  |  105  |  44[H]  ----------------------------<  241[H]  3.6   |  23  |  2.02[H]    Ca    9.2      07 Oct 2024 06:15  Phos  4.1     10-07  Mg     2.70     10-07    TPro  7.0  /  Alb  4.3  /  TBili  0.3  /  DBili  x   /  AST  21  /  ALT  15  /  AlkPhos  46  10-07          Urinalysis Basic - ( 07 Oct 2024 06:15 )    Color: x / Appearance: x / SG: x / pH: x  Gluc: 241 mg/dL / Ketone: x  / Bili: x / Urobili: x   Blood: x / Protein: x / Nitrite: x   Leuk Esterase: x / RBC: x / WBC x   Sq Epi: x / Non Sq Epi: x / Bacteria: x        RADIOLOGY & ADDITIONAL TESTS:    Imaging Personally Reviewed:    Consultant(s) Notes Reviewed:      Care Discussed with Consultants/Other Providers:    Rika Cleveland MD, Internal Medicine Resident     Patient is a 59y old  Male who presents with a chief complaint of thyroid ophthalmopathy. (07 Oct 2024 13:38)      SUBJECTIVE / OVERNIGHT EVENTS: Patient seen and examined at bedside. No acute events overnight. Pt denied any eye pain, pain w/ eye mov't. chest pain, SOB, N/V, diarrhea, or constipation.    MEDICATIONS  (STANDING):  amLODIPine   Tablet 10 milliGRAM(s) Oral daily  bisacodyl 5 milliGRAM(s) Oral every 12 hours  dextrose 5%. 1000 milliLiter(s) (100 mL/Hr) IV Continuous <Continuous>  dextrose 5%. 1000 milliLiter(s) (50 mL/Hr) IV Continuous <Continuous>  dextrose 50% Injectable 12.5 Gram(s) IV Push once  dextrose 50% Injectable 25 Gram(s) IV Push once  dextrose 50% Injectable 25 Gram(s) IV Push once  dextrose Oral Gel 15 Gram(s) Oral once  glucagon  Injectable 1 milliGRAM(s) IntraMuscular once  influenza   Vaccine 0.5 milliLiter(s) IntraMuscular once  insulin glargine Injectable (LANTUS) 18 Unit(s) SubCutaneous at bedtime  insulin lispro (ADMELOG) corrective regimen sliding scale   SubCutaneous three times a day before meals  insulin lispro (ADMELOG) corrective regimen sliding scale   SubCutaneous at bedtime  insulin lispro Injectable (ADMELOG) 9 Unit(s) SubCutaneous three times a day before meals  methimazole 10 milliGRAM(s) Oral daily  metoprolol succinate ER 50 milliGRAM(s) Oral daily  montelukast 10 milliGRAM(s) Oral daily  predniSONE   Tablet 80 milliGRAM(s) Oral daily  rosuvastatin 20 milliGRAM(s) Oral at bedtime  senna 2 Tablet(s) Oral at bedtime    MEDICATIONS  (PRN):  acetaminophen     Tablet .. 650 milliGRAM(s) Oral every 6 hours PRN Temp greater or equal to 38C (100.4F), Mild Pain (1 - 3)  polyethylene glycol 3350 17 Gram(s) Oral two times a day PRN Constipation      Vital Signs Last 24 Hrs  T(C): 36.7 (08 Oct 2024 05:07), Max: 36.7 (07 Oct 2024 12:58)  T(F): 98 (08 Oct 2024 05:07), Max: 98.1 (07 Oct 2024 22:00)  HR: 64 (08 Oct 2024 05:07) (64 - 78)  BP: 119/68 (08 Oct 2024 05:07) (119/68 - 156/87)  BP(mean): --  RR: 18 (08 Oct 2024 05:07) (17 - 18)  SpO2: 96% (08 Oct 2024 05:07) (96% - 97%)    Parameters below as of 08 Oct 2024 05:07  Patient On (Oxygen Delivery Method): room air      CAPILLARY BLOOD GLUCOSE      POCT Blood Glucose.: 254 mg/dL (07 Oct 2024 21:55)  POCT Blood Glucose.: 248 mg/dL (07 Oct 2024 18:10)  POCT Blood Glucose.: 292 mg/dL (07 Oct 2024 12:02)  POCT Blood Glucose.: 235 mg/dL (07 Oct 2024 08:39)    I&O's Summary      PHYSICAL EXAM:  GENERAL: NAD, resting comfortably in bed  HEAD:  Atraumatic, Normocephalic  EYES: Severe proptosis with b/l upper lid ptosis, EOMI, PERRLA, conjunctiva and sclera clear  NECK: Supple, No JVD  CHEST/LUNG: Clear to auscultation bilaterally; No wheeze  HEART: Regular rate and rhythm; No murmurs, rubs, or gallops  ABDOMEN: Soft, Nontender, Nondistended; Bowel sounds present  EXTREMITIES:  2+ Peripheral Pulses, No clubbing, cyanosis, or edema  PSYCH: AAOx3  NEUROLOGY: non-focal  SKIN: No rashes or lesions    LABS:                        11.8   12.64 )-----------( 251      ( 07 Oct 2024 06:15 )             35.6     10-07    143  |  105  |  44[H]  ----------------------------<  241[H]  3.6   |  23  |  2.02[H]    Ca    9.2      07 Oct 2024 06:15  Phos  4.1     10-07  Mg     2.70     10-07    TPro  7.0  /  Alb  4.3  /  TBili  0.3  /  DBili  x   /  AST  21  /  ALT  15  /  AlkPhos  46  10-07          Urinalysis Basic - ( 07 Oct 2024 06:15 )    Color: x / Appearance: x / SG: x / pH: x  Gluc: 241 mg/dL / Ketone: x  / Bili: x / Urobili: x   Blood: x / Protein: x / Nitrite: x   Leuk Esterase: x / RBC: x / WBC x   Sq Epi: x / Non Sq Epi: x / Bacteria: x        RADIOLOGY & ADDITIONAL TESTS:    Imaging Personally Reviewed:    Consultant(s) Notes Reviewed:      Care Discussed with Consultants/Other Providers:    Rika Cleveland MD, Internal Medicine Resident

## 2024-10-08 NOTE — DISCHARGE NOTE NURSING/CASE MANAGEMENT/SOCIAL WORK - PATIENT PORTAL LINK FT
You can access the FollowMyHealth Patient Portal offered by [facility] by registering at the following website: [email or web address]. By joining [employer] [facility]’s FollowMyHealth portal, you will also be able to view your health information using other applications (apps) compatible with our system.

## 2024-10-08 NOTE — PROGRESS NOTE ADULT - ATTENDING COMMENTS
59 yr old M hx of Graves Disease, HTN, DM, HLD, CKD, eosinophilia who was sent to the ED by his outpatient ophthalmologist for MRI findings c/f thyroid disease and optic nerve atrophy.    #Endophthalmitis, Optic nerve atrophy bilaterally   #Graves Disease   TSH: 0.17, t4/t3 wnl, Thyroglobulin elevated 351, TPO wnl: 16.2. thyroid us with stable thyroid nodule.   per Optho, s/p IV solumedrol 250 q 6 x3d followed by oral prednisone taper  QuantiFeron gold in lab  patient will likely need teprotumumab in the future   Endo recs appreciated c/w home methimazole for now, recheck Free T4 in one week by 10/12    #DM, steroids induced hyperglycemia. per Endo, basal/bolus on dc while on steroids. pt's daughter is a nurse, can help with insulin.   home medications: Ozempic 1mg, Farxiga 10mg, Glimepiride 2mg. Final dc recs per endo.     patient is stable for dc to home.   rest of care as stated above.
Patient seen and examined. Case discussed with Dr. Benavidez     59 yr old M with history of Graves Disease, HTN, DM, HLD, CKD, eosinophilia who was sent to the ED by his outpatient ophthalmologist for MRI findings c/f thyroid disease and optic nerve atrophy. Outpatient MRI of the orbits  showed severe enlargement of EOMS, bilateral orbital fat expansion, optic nerve crowding at apex bilaterally. Optho evaluated the patient in the ED and recommended IV steroids.     #Thyroid Eye Disease  # Optic nerve atrophy bilaterally   #Graves Disease   TSH: 0.17 , Thyroglobulin elevated 351, TPO wnl: 16.2  Total T4 wnl: 7.76, FT4 wnl 1.4  CT noncon: bilaterally symmetric proptosis. severe diffuse enlargement of extraocular muscle bellies bilaterally, with crowding at the orbital apices bilaterally. Optic nerve sheaths appear stretched bilaterally.  patient reports slight vision improvement   -Optho recs appreciated, c/w IV solumedrol 250 q 6 for at least 3 days total   -QuantiFeron gold in lab  -patient will likely need teprotumumab in the future   -Endo recs appreciated c/w home methimazole for now, recheck Free T4 in one week by 10/12  -Endo consulted, f/u recs      #DM  #Steroid induced hyperglycemia   home medications: Ozempic 1mg, Farxiga 10mg, Glimepiride 2mg  -endo recs appreciated   -increase admelog to 9 units TID  -increase Lantus to 18 units at bedtime   -moderate ISS  -Hold PO diabetes med while inpatient     #CKD Stage 3b   per family baseline Cr.  2.2 2023  Cr on admission at baseline   creatinine uptrended to 2.52   -trend creatinine   -f/u urine studies   -can hold ARB and fibrate given acute rise     rest of care as stated above.
59 yr old M hx of Graves Disease, HTN, DM, HLD, CKD, eosinophilia who was sent to the ED by his outpatient ophthalmologist for MRI findings c/f thyroid disease and optic nerve atrophy.    #Endophthalmitis, Optic nerve atrophy bilaterally   #Graves Disease   TSH: 0.17, t4/t3 wnl, Thyroglobulin elevated 351, TPO wnl: 16.2. thyroid us with stable thyroid nodule.   per Optho, s/p IV solumedrol 250 q 6 x3d followed by oral prednisone taper  QuantiFeron gold in lab  patient will likely need teprotumumab in the future   Endo recs appreciated c/w home methimazole for now, recheck Free T4 in one week by 10/12    #DM, steroids induced hyperglycemia. per Endo, basal/bolus on dc while on steroids. pt's daughter is a nurse, can help with insulin. home medications: Ozempic 1mg, Farxiga 10mg, Glimepiride 2mg      rest of care as stated above.

## 2024-10-08 NOTE — PROGRESS NOTE ADULT - REASON FOR ADMISSION
thyroid ophthalmopathy.
thyroid ophthalmopathy
thyroid ophthalmopathy
thyroid ophthalmopathy.

## 2024-10-08 NOTE — PROGRESS NOTE ADULT - PROBLEM SELECTOR PLAN 3
~Cr 2.2 in 2023 per family  - possible KIERSTEN on CKD given his Cr worsened to 2.5 on admission  - Cr 2.02 today - KIERSTEN now resolved  - can consider restarting home ACE and fibrate tmrw  - c/w monitoring renal function and urine output  - Avoid any potential nephrotoxins when possible and dose medications per eGFR. ~Cr 2.2 in 2023 per family  - possible KIERSTEN on CKD given his Cr worsened to 2.5 on admission  - Cr 2.08 - KIERSTEN now resolved  - can consider restarting home ACE and fibrate upon d/c  - c/w monitoring renal function and urine output  - Avoid any potential nephrotoxins when possible and dose medications per eGFR.

## 2024-10-08 NOTE — PROGRESS NOTE ADULT - ASSESSMENT
59M with pmhx of Graves disease was sent in by [facility] outpatient ophthalmology after found to have bilateral thyroid eye disease with bilateral optic nerve atrophy for IV steroid treatment on 10/4/2.  Endocrine called for hyperthyroidism      Hyperthyroidism    - with longstanding hyperthyroidism on methimzole 10mg X15 years complicated by thyroid eye disease  - TSHrab +7.57 --> consistent with graves   - US thyroid - Left thyroid nodule, not significantly changed and correspond to the previously biopsied benign nodule.  - send TSI ---> pending, specimen received   - TSH 0.17 total T4 WNL  - Free T4 1.4 WNL  - c/w methimazole 10mg daily   - HR well controlled no role of Beta blocker for hyperthyroidism currently   - steroids and Tx for MAULIK (thyroid eye disease) as per ophtho     Discharge plan   - discharge on: methimazole 10mg daily   - needs outpatient endocrine follow up  - can discuss surgery as potential tx to help reduce antibody burden   - ophtho f/u to discuss Tepazza as well           DM type 2  - a1c 7.1  - Home medication: Ozempic 1 mg weekly, last dose September 30 + glimepiride 2 mg daily + Farxiga 10 mg daily + Janumet  mg.  Per patient metformin was discontinued due to renal dysfunction.  - Endocrinologist: Used to follow-up with an endocrinologist but stopped.  Now follows up with PCP.  Patient is interested in following up in our clinic.  - Patient does not regularly check his blood glucose.       Inpatient plan   - FS goal 100-180 mg/dl   - s/p methylprednisolone 250 mg every 6 hours, last dose at 5 AM on 10/07.  - Per primary team, starting tomorrow, plan for prednisone 80 mg daily for 3 days then 60 mg daily for 3 days then stop.   - fingersticks at goal  - Continue Lantus 18 units at bedtime  - Continue Admelog 9 units TID AC. Hold if not eating/NPO.   - continue moderate Admelog correctional scale TID AC  - continue separate moderate Admelog correctional scale at HS   - FS TID AC & HS ---> q6 if NPO   - hypoglycemia protocol PRN   - consistent carbohydrate diet  - RD consult  - provider to RN - Insulin pen teaching      Discharge plan  - Given age and renal dysfunction, would stop glimepiride.  - Stop Janumet as patient is already on Ozempic.  - Per primary team, plan to discharge on prednisone 80 mg daily for 3 days then 60 mg daily for 3 days then stop.   - Discharge on Ozempic 1 mg subQ weekly + Farxiga 10 mg daily + Semglee 14 units daily at bedtime + Humalog 7 units TID AC while on prednisone 80 mg.  Decrease Humalog to 5 units TID AC once on prednisone 60 mg.  Stop Humalog once of prednisone.  - Plan discussed with daughter over the phone and updated her of the plan.  - please send prescription for glucometer and supplies - test strip, lancets, alcohol pads and insulin pen needles.   - Please send prescription for Glucose tablets 4G (take 4 tablets) or 15G tablets for blood sugar less than 70 mG/dL, repeat fingerstick in 15 minutes.  - Follow-up with [facility] [place]:  [address] [place] [address]. [address]  [place] [address]  [phone]   Appointments: 10/30 at 4 PM; 02/03 at 3 PM with Dr. Sevilla.  (office made aware to call the patient for appointment on 10/07/2024)        HTN  - goal BP in DM <130/80  - outpt mc/cr ratio  - Continue metoprolol, Norvasc  - management per primary team         HLD  - LDL goal <70   - Continue Crestor  - Check lipid panel if not done recently  - management per primary team           SHERWIN Adams-BC  Nurse Practitioner  Division of Endocrinology & Diabetes  pager [phone]

## 2024-10-08 NOTE — DISCHARGE NOTE NURSING/CASE MANAGEMENT/SOCIAL WORK - NSDCPEFALRISK_GEN_ALL_CORE
For information on Fall & Injury Prevention, visit: [email or web address] OR  [email or web address] OR  https://www.cdc.gov/steadi/patient.html

## 2024-10-08 NOTE — PROGRESS NOTE ADULT - PROVIDER SPECIALTY LIST ADULT
Internal Medicine
Ophthalmology
Endocrinology
Endocrinology
Ophthalmology
Internal Medicine
Internal Medicine

## 2024-10-08 NOTE — PROGRESS NOTE ADULT - SUBJECTIVE AND OBJECTIVE BOX
Chief Complaint: DM type 2     Interval Events: Fingersticks at goal.  Good appetite.  On prednisone 80 mg OD x3 days then 60 mg OD x3 days.  No nausea vomiting or abdominal pain.    MEDICATIONS  (STANDING):  amLODIPine   Tablet 10 milliGRAM(s) Oral daily  bisacodyl 5 milliGRAM(s) Oral every 12 hours  dextrose 5%. 1000 milliLiter(s) (50 mL/Hr) IV Continuous <Continuous>  dextrose 5%. 1000 milliLiter(s) (100 mL/Hr) IV Continuous <Continuous>  dextrose 50% Injectable 25 Gram(s) IV Push once  dextrose 50% Injectable 12.5 Gram(s) IV Push once  dextrose 50% Injectable 25 Gram(s) IV Push once  dextrose Oral Gel 15 Gram(s) Oral once  glucagon  Injectable 1 milliGRAM(s) IntraMuscular once  influenza   Vaccine 0.5 milliLiter(s) IntraMuscular once  insulin glargine Injectable (LANTUS) 18 Unit(s) SubCutaneous at bedtime  insulin lispro (ADMELOG) corrective regimen sliding scale   SubCutaneous three times a day before meals  insulin lispro (ADMELOG) corrective regimen sliding scale   SubCutaneous at bedtime  insulin lispro Injectable (ADMELOG) 9 Unit(s) SubCutaneous three times a day before meals  methimazole 10 milliGRAM(s) Oral daily  metoprolol succinate ER 50 milliGRAM(s) Oral daily  montelukast 10 milliGRAM(s) Oral daily  predniSONE   Tablet 80 milliGRAM(s) Oral every 24 hours  rosuvastatin 20 milliGRAM(s) Oral at bedtime  senna 2 Tablet(s) Oral at bedtime    MEDICATIONS  (PRN):  acetaminophen     Tablet .. 650 milliGRAM(s) Oral every 6 hours PRN Temp greater or equal to 38C (100.4F), Mild Pain (1 - 3)  polyethylene glycol 3350 17 Gram(s) Oral two times a day PRN Constipation      Allergies  penicillin (Hives)    Review of Systems:  Constitutional: No fever/chills   Cardiovascular: No chest pain, no palpitations  Respiratory: No SOB, no cough  GI: No nausea, vomiting or abdominal pain  : No dysuria  Endocrine: no polyuria, polydipsia  Skin: no rash      VITALS: T(C): 36.7 (10-08-24 @ 12:18)  T(F): 98.1 (10-08-24 @ 12:18), Max: 98.1 (10-07-24 @ 22:00)  HR: 68 (10-08-24 @ 12:18) (64 - 68)  BP: 146/76 (10-08-24 @ 12:18) (119/68 - 156/87)  RR:  (17 - 18)  SpO2:  (96% - 97%)  Wt(kg): --      Physical Exam:   GENERAL: NAD, well-groomed, well-developed  RESPIRATORY: non labored breathing   GI: Soft, nontender, non distended  SKIN: Dry, intact, No rashes or lesions  MUSCULOSKELETAL: Full range of motion, normal strength  NEURO: A&Ox3  SKIN: Dry, intact, No rashes or lesions      CAPILLARY BLOOD GLUCOSE  POCT Blood Glucose.: 144 mg/dL (08 Oct 2024 12:52)  POCT Blood Glucose.: 124 mg/dL (08 Oct 2024 08:55)  POCT Blood Glucose.: 254 mg/dL (07 Oct 2024 21:55)  POCT Blood Glucose.: 248 mg/dL (07 Oct 2024 18:10)      10-08    142  |  106  |  44[H]  ----------------------------<  142[H]  3.7   |  25  |  2.08[H]    eGFR: 36[L]    Ca    8.3[L]      10-08  Mg     2.80     10-08  Phos  3.2     10-08    TPro  6.3  /  Alb  4.0  /  TBili  0.2  /  DBili  x   /  AST  18  /  ALT  16  /  AlkPhos  42  10-08      A1C with Estimated Average Glucose Result: 7.1 % (10-06-24 @ 07:10)      Thyroid Function Tests:  10-06 @ 07:10 TSH -- FreeT4 1.4 T3 -- Anti TPO -- Anti Thyroglobulin Ab -- TSI --  10-04 @ 21:41 TSH 0.17 FreeT4 -- T3 81 Anti TPO 16.2 Anti Thyroglobulin Ab 17.0 TSI --

## 2024-10-08 NOTE — PROGRESS NOTE ADULT - ASSESSMENT
59 M with pmhx of Graves disease, HTN, HLD, T2DM, and CKD was sent in by [facility] outpatient ophthalmology after found to have bilateral thyroid eye disease with bilateral optic nerve atrophy for IV steroid treatment on 10/4/24.  CT showing b/l symmetric proptosis with optic nerve sheaths appear stretched bilaterally, thyroid ophthalmopathy, severe subconjunctival fat prolapse/herniation bilaterally, large right mastoid effusion -correlate clinically for sterile effusion versus acute otitis media. Admitted for IV steroid tx and further management. Evaluated by ophthalmology and endocrinology.  WBC 7.27, AG 15, BUN 26, Cr 2.29, GFR 32. Free T4 7.76 wnl, T3 81 wnl, TSH 0.17 low, Thyroglobulin 351 high, TPO 16.2.  Patient was started Methylprednisolone IV 1g in ED and on 250mg IV q6h - 3 day total course finished today.  KIERSTEN on CKD now resolved. For DM, patient is on 18 lantus and Admelog 9-9-9.  59 M with pmhx of Graves disease, HTN, HLD, T2DM, and CKD was sent in by [facility] outpatient ophthalmology after found to have bilateral thyroid eye disease with bilateral optic nerve atrophy for IV steroid treatment on 10/4/24.  CT showing b/l symmetric proptosis with optic nerve sheaths appear stretched bilaterally, thyroid ophthalmopathy, severe subconjunctival fat prolapse/herniation bilaterally, large right mastoid effusion -correlate clinically for sterile effusion versus acute otitis media. Admitted for IV steroid tx and further management. Evaluated by ophthalmology and endocrinology.  WBC 7.27, AG 15, BUN 26, Cr 2.29, GFR 32. Free T4 7.76 wnl, T3 81 wnl, TSH 0.17 low, Thyroglobulin 351 high, TPO 16.2.  Patient was started Methylprednisolone IV 1g in ED and on 250mg IV q6h - 3 day total course finished 10/7 - to be continued on oral steroids upon d/c. KIERSTEN on CKD now resolved. For DM, patient is on 18 lantus and Admelog 9-9-9.

## 2024-10-08 NOTE — PROGRESS NOTE ADULT - PROBLEM SELECTOR PLAN 5
- c/w home med: metoprolol 50mg, Amlodipine 10mg, aspirin 81mg  - Held Ramipril 10mg (here as lisinopril 40mg)  - given resolution of possible KIERSTEN on CKD, can consider resuming Ramipril tmrw - c/w home med: metoprolol 50mg, Amlodipine 10mg, aspirin 81mg  - Held Ramipril 10mg (here as lisinopril 40mg)  - given resolution of possible KIERSTEN on CKD, can resume Ramipril upon d/c

## 2024-10-08 NOTE — PROGRESS NOTE ADULT - PROBLEM SELECTOR PLAN 1
- Ophthalmology consulted - had a conversation that visual recovery is uncertain at this time  - CT showing b/l symmetric proptosis with optic nerve sheaths appear stretched bilaterally, thyroid ophthalmopathy, severe subconjunctival fat prolapse/herniation bilaterally, large right mastoid effusion -correlate clinically for sterile effusion versus acute otitis media  - thyroid U/S (10/6): L thyroid nodule (3.2 x 3.1 x 2.8cm) not significantly changed  - free T4 7.76 wnl, T3 81 wnl, TSH 0.17 low, Thyroglobulin 351 high, TPO 16.2, TSH receptor Ab 7.57 high, TSI pending result  - s/p solumedrol IV 1 g in ED, solumedrol 250mg q6h for 2 days - course finished today (10/7)  - will start tmrw prednisone 80mg PO QD x 3 days, then 60mg PO QD x 3 days - pt has f/u appt w/ ophtho this week  - Pending quant gold result  - Pt will likely need for teprotumumab in the future per ophtho - Ophthalmology consulted - had a conversation that visual recovery is uncertain at this time  - CT showing b/l symmetric proptosis with optic nerve sheaths appear stretched bilaterally, thyroid ophthalmopathy, severe subconjunctival fat prolapse/herniation bilaterally, large right mastoid effusion -correlate clinically for sterile effusion versus acute otitis media  - thyroid U/S (10/6): L thyroid nodule (3.2 x 3.1 x 2.8cm) not significantly changed  - free T4 7.76 wnl, T3 81 wnl, TSH 0.17 low, Thyroglobulin 351 high, TPO 16.2, TSH receptor Ab 7.57 high, TSI pending result  - s/p solumedrol IV 1 g in ED, solumedrol 250mg q6h for 2 days - course finished 10/7  - started prednisone 80mg PO QD x 3 days, then 60mg PO QD x 3 days - pt has f/u appt w/ ophtho this Friday  - Pending quant gold result  - Pt will likely need for teprotumumab in the future per ophtho

## 2024-10-08 NOTE — PROGRESS NOTE ADULT - ASSESSMENT
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 Pt with progressive proptosis over the past year.    # Thyroid eye disease  - exam with continued improvement today 10/8/24 [facility] 20/25-OD, 20/20 OS, color plates 8/12, 12/12   - initial exam VA CF OD, 20/30 OS and color plates OD 0/11, OS 8/11  - EOM with trace abduction deficit OD  - initial ext exam with severe proptosis with bilateral upper lid ptosis. Periorbital adipose expansion with prolapse and mild resistance to retropulsion   - TSHRab elevated,  (high), TSH 0.17, T3 81, TPO 16.2 (wnl)   - Please obtain labs - quant gold   - CT noncon reviewed - bilaterally symmetric proptosis. severe diffuse enlargement of extraocular muscle bellies bilaterally, with crowding at the orbital apices bilaterally. Optic nerve sheaths appear stretched bilaterally.  - Endo recs appreciated   - Had a conversation that visual recovery is uncertain at this time   - Pt will likely need for teprotumumab in the future   - s/p 250 mg solumedrol IV q6h or 1 g daily for 3 days total  - After receiving 3g of IV solumedrol, pt is to be discharged on PO pred 80mg daily for 3 days, then 60mg pred PO daily for 3 days  - Pt has an appt with Dr. Piedra later this week, pt aware

## 2024-10-08 NOTE — PROGRESS NOTE ADULT - PROBLEM SELECTOR PLAN 6
- c/w home med: rosuvastatin 20mg  - Held fenofibrate 160mg - can consider restarting tmrw given resolution of possible KIERSTEN on CKD - c/w home med: rosuvastatin 20mg  - Held fenofibrate 160mg - can restart upon d/c given resolution of possible KIERSTEN on CKD

## 2024-10-09 ENCOUNTER — APPOINTMENT (OUTPATIENT)
Dept: SPEECH THERAPY | Facility: CLINIC | Age: 59
End: 2024-10-09

## 2024-10-09 ENCOUNTER — OUTPATIENT (OUTPATIENT)
Dept: OUTPATIENT SERVICES | Facility: HOSPITAL | Age: 59
LOS: 1 days | Discharge: HOME | End: 2024-10-09

## 2024-10-09 LAB
GAMMA INTERFERON BACKGROUND BLD IA-ACNC: 0.04 IU/ML — SIGNIFICANT CHANGE UP
M TB IFN-G BLD-IMP: NEGATIVE — SIGNIFICANT CHANGE UP
M TB IFN-G CD4+ BCKGRND COR BLD-ACNC: 0 IU/ML — SIGNIFICANT CHANGE UP
M TB IFN-G CD4+CD8+ BCKGRND COR BLD-ACNC: 0 IU/ML — SIGNIFICANT CHANGE UP
MRSA PCR RESULT.: SIGNIFICANT CHANGE UP
QUANT TB PLUS MITOGEN MINUS NIL: >10 IU/ML — SIGNIFICANT CHANGE UP
S AUREUS DNA NOSE QL NAA+PROBE: SIGNIFICANT CHANGE UP

## 2024-10-11 ENCOUNTER — NON-APPOINTMENT (OUTPATIENT)
Age: 59
End: 2024-10-11

## 2024-10-11 ENCOUNTER — APPOINTMENT (OUTPATIENT)
Dept: OPHTHALMOLOGY | Facility: CLINIC | Age: 59
End: 2024-10-11
Payer: COMMERCIAL

## 2024-10-11 PROCEDURE — 92285 EXTERNAL OCULAR PHOTOGRAPHY: CPT

## 2024-10-11 PROCEDURE — 99214 OFFICE O/P EST MOD 30 MIN: CPT

## 2024-10-11 PROCEDURE — 92083 EXTENDED VISUAL FIELD XM: CPT

## 2024-10-23 DIAGNOSIS — H90.3 SENSORINEURAL HEARING LOSS, BILATERAL: ICD-10-CM

## 2024-10-30 ENCOUNTER — APPOINTMENT (OUTPATIENT)
Dept: ENDOCRINOLOGY | Facility: CLINIC | Age: 59
End: 2024-10-30

## 2024-11-19 ENCOUNTER — APPOINTMENT (OUTPATIENT)
Dept: OPHTHALMOLOGY | Facility: CLINIC | Age: 59
End: 2024-11-19

## 2024-11-19 PROCEDURE — 99213 OFFICE O/P EST LOW 20 MIN: CPT | Mod: 1L

## 2024-11-19 PROCEDURE — 92285 EXTERNAL OCULAR PHOTOGRAPHY: CPT | Mod: 1L

## 2024-11-20 ENCOUNTER — APPOINTMENT (OUTPATIENT)
Dept: ENDOCRINOLOGY | Facility: CLINIC | Age: 59
End: 2024-11-20
Payer: COMMERCIAL

## 2024-11-20 VITALS
BODY MASS INDEX: 29.03 KG/M2 | HEIGHT: 69 IN | SYSTOLIC BLOOD PRESSURE: 140 MMHG | HEART RATE: 73 BPM | DIASTOLIC BLOOD PRESSURE: 80 MMHG | OXYGEN SATURATION: 99 % | WEIGHT: 196 LBS

## 2024-11-20 DIAGNOSIS — I10 ESSENTIAL (PRIMARY) HYPERTENSION: ICD-10-CM

## 2024-11-20 DIAGNOSIS — E11.9 TYPE 2 DIABETES MELLITUS W/OUT COMPLICATIONS: ICD-10-CM

## 2024-11-20 DIAGNOSIS — E78.5 HYPERLIPIDEMIA, UNSPECIFIED: ICD-10-CM

## 2024-11-20 DIAGNOSIS — E05.90 THYROTOXICOSIS, UNSPECIFIED W/OUT THYROTOXIC CRISIS OR STORM: ICD-10-CM

## 2024-11-20 PROCEDURE — 99214 OFFICE O/P EST MOD 30 MIN: CPT

## 2024-11-20 RX ORDER — DAPAGLIFLOZIN 10 MG/1
10 TABLET, FILM COATED ORAL DAILY
Qty: 90 | Refills: 0 | Status: ACTIVE | COMMUNITY
Start: 2024-11-20 | End: 1900-01-01

## 2024-12-31 ENCOUNTER — APPOINTMENT (OUTPATIENT)
Dept: OPHTHALMOLOGY | Facility: CLINIC | Age: 59
End: 2024-12-31
Payer: COMMERCIAL

## 2024-12-31 ENCOUNTER — NON-APPOINTMENT (OUTPATIENT)
Age: 59
End: 2024-12-31

## 2024-12-31 PROCEDURE — 92133 CPTRZD OPH DX IMG PST SGM ON: CPT

## 2024-12-31 PROCEDURE — 99214 OFFICE O/P EST MOD 30 MIN: CPT

## 2024-12-31 PROCEDURE — 92285 EXTERNAL OCULAR PHOTOGRAPHY: CPT

## 2025-01-15 DIAGNOSIS — E11.9 TYPE 2 DIABETES MELLITUS W/OUT COMPLICATIONS: ICD-10-CM

## 2025-01-15 RX ORDER — INSULIN GLARGINE-YFGN 100 [IU]/ML
100 INJECTION, SOLUTION SUBCUTANEOUS AT BEDTIME
Qty: 1 | Refills: 0 | Status: DISCONTINUED | COMMUNITY
Start: 2025-01-15 | End: 2025-01-15

## 2025-01-15 RX ORDER — INSULIN DEGLUDEC INJECTION 100 U/ML
100 INJECTION, SOLUTION SUBCUTANEOUS
Qty: 1 | Refills: 3 | Status: ACTIVE | COMMUNITY
Start: 2025-01-15 | End: 1900-01-01

## 2025-02-03 ENCOUNTER — APPOINTMENT (OUTPATIENT)
Dept: ENDOCRINOLOGY | Facility: CLINIC | Age: 60
End: 2025-02-03
Payer: COMMERCIAL

## 2025-02-03 VITALS
SYSTOLIC BLOOD PRESSURE: 130 MMHG | DIASTOLIC BLOOD PRESSURE: 70 MMHG | OXYGEN SATURATION: 97 % | HEART RATE: 74 BPM | HEIGHT: 69 IN | WEIGHT: 189 LBS | BODY MASS INDEX: 27.99 KG/M2

## 2025-02-03 DIAGNOSIS — E07.9 DISORDER OF THYROID, UNSPECIFIED: ICD-10-CM

## 2025-02-03 DIAGNOSIS — I10 ESSENTIAL (PRIMARY) HYPERTENSION: ICD-10-CM

## 2025-02-03 DIAGNOSIS — D35.00 BENIGN NEOPLASM OF UNSPECIFIED ADRENAL GLAND: ICD-10-CM

## 2025-02-03 DIAGNOSIS — E05.90 THYROTOXICOSIS, UNSPECIFIED W/OUT THYROTOXIC CRISIS OR STORM: ICD-10-CM

## 2025-02-03 DIAGNOSIS — E78.5 HYPERLIPIDEMIA, UNSPECIFIED: ICD-10-CM

## 2025-02-03 DIAGNOSIS — E11.9 TYPE 2 DIABETES MELLITUS W/OUT COMPLICATIONS: ICD-10-CM

## 2025-02-03 LAB — HBA1C MFR BLD HPLC: 9.7

## 2025-02-03 PROCEDURE — 83036 HEMOGLOBIN GLYCOSYLATED A1C: CPT | Mod: QW

## 2025-02-03 PROCEDURE — G2211 COMPLEX E/M VISIT ADD ON: CPT | Mod: NC

## 2025-02-03 PROCEDURE — 99215 OFFICE O/P EST HI 40 MIN: CPT

## 2025-02-03 RX ORDER — DEXAMETHASONE 1 MG/1
1 TABLET ORAL
Qty: 1 | Refills: 0 | Status: ACTIVE | COMMUNITY
Start: 2025-02-03 | End: 1900-01-01

## 2025-02-03 RX ORDER — REPAGLINIDE 1 MG/1
1 TABLET ORAL 3 TIMES DAILY
Qty: 180 | Refills: 1 | Status: ACTIVE | COMMUNITY
Start: 2025-02-03 | End: 1900-01-01

## 2025-02-04 LAB
ALBUMIN SERPL ELPH-MCNC: 4.8 G/DL
ALDOSTERONE SERUM: 34.3 NG/DL
ALP BLD-CCNC: 57 U/L
ALT SERPL-CCNC: 11 U/L
AST SERPL-CCNC: 7 U/L
BASOPHILS # BLD AUTO: 0.07 K/UL
BASOPHILS NFR BLD AUTO: 0.9 %
BILIRUB DIRECT SERPL-MCNC: 0.1 MG/DL
BILIRUB INDIRECT SERPL-MCNC: 0.2 MG/DL
BILIRUB SERPL-MCNC: 0.3 MG/DL
CREAT SPEC-SCNC: 45 MG/DL
EOSINOPHIL # BLD AUTO: 0.48 K/UL
EOSINOPHIL NFR BLD AUTO: 6.4 %
HCT VFR BLD CALC: 38.5 %
HGB BLD-MCNC: 12.8 G/DL
IMM GRANULOCYTES NFR BLD AUTO: 0.4 %
LYMPHOCYTES # BLD AUTO: 2.59 K/UL
LYMPHOCYTES NFR BLD AUTO: 34.3 %
MAN DIFF?: NORMAL
MCHC RBC-ENTMCNC: 27.4 PG
MCHC RBC-ENTMCNC: 33.2 G/DL
MCV RBC AUTO: 82.3 FL
MICROALBUMIN 24H UR DL<=1MG/L-MCNC: 27.3 MG/DL
MICROALBUMIN/CREAT 24H UR-RTO: 609 MG/G
MONOCYTES # BLD AUTO: 0.49 K/UL
MONOCYTES NFR BLD AUTO: 6.5 %
NEUTROPHILS # BLD AUTO: 3.89 K/UL
NEUTROPHILS NFR BLD AUTO: 51.5 %
PLATELET # BLD AUTO: 245 K/UL
PROT SERPL-MCNC: 7.6 G/DL
RBC # BLD: 4.68 M/UL
RBC # FLD: 12.5 %
T3 SERPL-MCNC: 79 NG/DL
T4 FREE SERPL-MCNC: 1.3 NG/DL
TSH RECEPTOR AB: 3.04 IU/L
TSH SERPL-ACNC: 1.83 UIU/ML
WBC # FLD AUTO: 7.55 K/UL

## 2025-02-06 LAB — TSI ACT/NOR SER: 1.15 IU/L

## 2025-02-15 LAB — RENIN ACTIVITY, PLASMA: 0.18 NG/ML/HR

## 2025-02-20 DIAGNOSIS — E26.9 HYPERALDOSTERONISM, UNSPECIFIED: ICD-10-CM

## 2025-02-20 RX ORDER — INSULIN LISPRO 100 [IU]/ML
100 INJECTION, SOLUTION INTRAVENOUS; SUBCUTANEOUS
Qty: 2 | Refills: 3 | Status: ACTIVE | COMMUNITY
Start: 2025-02-20 | End: 1900-01-01

## 2025-02-20 RX ORDER — PEN NEEDLE, DIABETIC 33 GX5/32"
33G X 4 MM NEEDLE, DISPOSABLE MISCELLANEOUS
Qty: 300 | Refills: 3 | Status: ACTIVE | COMMUNITY
Start: 2025-02-20 | End: 1900-01-01

## 2025-03-03 ENCOUNTER — APPOINTMENT (OUTPATIENT)
Dept: OPHTHALMOLOGY | Facility: CLINIC | Age: 60
End: 2025-03-03
Payer: COMMERCIAL

## 2025-03-03 ENCOUNTER — NON-APPOINTMENT (OUTPATIENT)
Age: 60
End: 2025-03-03

## 2025-03-03 PROCEDURE — 92083 EXTENDED VISUAL FIELD XM: CPT

## 2025-03-03 PROCEDURE — 99204 OFFICE O/P NEW MOD 45 MIN: CPT

## 2025-03-03 PROCEDURE — 92133 CPTRZD OPH DX IMG PST SGM ON: CPT

## 2025-03-26 ENCOUNTER — NON-APPOINTMENT (OUTPATIENT)
Age: 60
End: 2025-03-26

## 2025-03-26 ENCOUNTER — APPOINTMENT (OUTPATIENT)
Dept: RHEUMATOLOGY | Facility: CLINIC | Age: 60
End: 2025-03-26
Payer: COMMERCIAL

## 2025-03-26 VITALS
OXYGEN SATURATION: 97 % | HEART RATE: 70 BPM | SYSTOLIC BLOOD PRESSURE: 138 MMHG | DIASTOLIC BLOOD PRESSURE: 89 MMHG | RESPIRATION RATE: 16 BRPM

## 2025-03-26 VITALS
HEART RATE: 72 BPM | SYSTOLIC BLOOD PRESSURE: 146 MMHG | DIASTOLIC BLOOD PRESSURE: 92 MMHG | RESPIRATION RATE: 16 BRPM | OXYGEN SATURATION: 97 % | TEMPERATURE: 97.6 F

## 2025-03-26 PROCEDURE — 36415 COLL VENOUS BLD VENIPUNCTURE: CPT

## 2025-03-26 PROCEDURE — 96360 HYDRATION IV INFUSION INIT: CPT

## 2025-03-26 PROCEDURE — 96361 HYDRATE IV INFUSION ADD-ON: CPT

## 2025-04-11 ENCOUNTER — APPOINTMENT (OUTPATIENT)
Dept: ENDOCRINOLOGY | Facility: CLINIC | Age: 60
End: 2025-04-11
Payer: COMMERCIAL

## 2025-04-11 VITALS
HEART RATE: 69 BPM | SYSTOLIC BLOOD PRESSURE: 130 MMHG | DIASTOLIC BLOOD PRESSURE: 70 MMHG | WEIGHT: 186 LBS | BODY MASS INDEX: 27.55 KG/M2 | OXYGEN SATURATION: 98 % | HEIGHT: 69 IN

## 2025-04-11 DIAGNOSIS — E78.5 HYPERLIPIDEMIA, UNSPECIFIED: ICD-10-CM

## 2025-04-11 DIAGNOSIS — I10 ESSENTIAL (PRIMARY) HYPERTENSION: ICD-10-CM

## 2025-04-11 DIAGNOSIS — E05.90 THYROTOXICOSIS, UNSPECIFIED W/OUT THYROTOXIC CRISIS OR STORM: ICD-10-CM

## 2025-04-11 DIAGNOSIS — E11.9 TYPE 2 DIABETES MELLITUS W/OUT COMPLICATIONS: ICD-10-CM

## 2025-04-11 DIAGNOSIS — D35.00 BENIGN NEOPLASM OF UNSPECIFIED ADRENAL GLAND: ICD-10-CM

## 2025-04-11 DIAGNOSIS — E04.1 NONTOXIC SINGLE THYROID NODULE: ICD-10-CM

## 2025-04-11 DIAGNOSIS — N18.32 CHRONIC KIDNEY DISEASE, STAGE 3B: ICD-10-CM

## 2025-04-11 DIAGNOSIS — E26.9 HYPERALDOSTERONISM, UNSPECIFIED: ICD-10-CM

## 2025-04-11 PROCEDURE — 99215 OFFICE O/P EST HI 40 MIN: CPT

## 2025-04-11 PROCEDURE — G2211 COMPLEX E/M VISIT ADD ON: CPT | Mod: NC

## 2025-04-11 RX ORDER — DEXAMETHASONE 1 MG/1
1 TABLET ORAL
Qty: 1 | Refills: 0 | Status: ACTIVE | COMMUNITY
Start: 2025-04-11 | End: 1900-01-01

## 2025-04-14 ENCOUNTER — NON-APPOINTMENT (OUTPATIENT)
Age: 60
End: 2025-04-14

## 2025-04-14 LAB
CORTIS SERPL-MCNC: 14.8 UG/DL
ESTIMATED AVERAGE GLUCOSE: 249 MG/DL
HBA1C MFR BLD HPLC: 10.3 %
T3 SERPL-MCNC: 79 NG/DL
T4 FREE SERPL-MCNC: 1.2 NG/DL
TSH RECEPTOR AB: 1.96 IU/L
TSH SERPL-ACNC: 4.22 UIU/ML
TSI ACT/NOR SER: 0.72 IU/L

## 2025-04-14 RX ORDER — METHIMAZOLE 5 MG/1
5 TABLET ORAL
Qty: 45 | Refills: 6 | Status: ACTIVE | COMMUNITY
Start: 2025-04-14 | End: 1900-01-01

## 2025-04-19 ENCOUNTER — APPOINTMENT (OUTPATIENT)
Dept: CT IMAGING | Facility: IMAGING CENTER | Age: 60
End: 2025-04-19

## 2025-04-19 LAB — DEXAMETHASONE LEVEL: <30 NG/DL

## 2025-05-13 ENCOUNTER — APPOINTMENT (OUTPATIENT)
Dept: NEPHROLOGY | Facility: CLINIC | Age: 60
End: 2025-05-13
Payer: COMMERCIAL

## 2025-05-13 VITALS
HEART RATE: 69 BPM | TEMPERATURE: 97.2 F | BODY MASS INDEX: 28.8 KG/M2 | DIASTOLIC BLOOD PRESSURE: 84 MMHG | OXYGEN SATURATION: 98 % | WEIGHT: 195 LBS | SYSTOLIC BLOOD PRESSURE: 143 MMHG

## 2025-05-13 DIAGNOSIS — E11.9 TYPE 2 DIABETES MELLITUS W/OUT COMPLICATIONS: ICD-10-CM

## 2025-05-13 DIAGNOSIS — N18.32 CHRONIC KIDNEY DISEASE, STAGE 3B: ICD-10-CM

## 2025-05-13 PROCEDURE — 99205 OFFICE O/P NEW HI 60 MIN: CPT

## 2025-05-14 LAB
ALBUMIN SERPL ELPH-MCNC: 4.7 G/DL
ANION GAP SERPL CALC-SCNC: 16 MMOL/L
APPEARANCE: CLEAR
BACTERIA: NEGATIVE /HPF
BILIRUBIN URINE: NEGATIVE
BLOOD URINE: ABNORMAL
BUN SERPL-MCNC: 25 MG/DL
CALCIUM SERPL-MCNC: 9.7 MG/DL
CALCIUM SERPL-MCNC: 9.7 MG/DL
CAST: 0 /LPF
CHLORIDE SERPL-SCNC: 106 MMOL/L
CO2 SERPL-SCNC: 22 MMOL/L
COLOR: YELLOW
CREAT SERPL-MCNC: 2.39 MG/DL
CREAT SPEC-SCNC: 71 MG/DL
CREAT SPEC-SCNC: 71 MG/DL
CREAT/PROT UR: 0.9 RATIO
EGFRCR SERPLBLD CKD-EPI 2021: 30 ML/MIN/1.73M2
EPITHELIAL CELLS: 0 /HPF
GLUCOSE QUALITATIVE U: NEGATIVE MG/DL
GLUCOSE SERPL-MCNC: 113 MG/DL
HCT VFR BLD CALC: 34.5 %
HGB BLD-MCNC: 11 G/DL
KETONES URINE: NEGATIVE MG/DL
LEUKOCYTE ESTERASE URINE: NEGATIVE
MICROALBUMIN 24H UR DL<=1MG/L-MCNC: 41.2 MG/DL
MICROALBUMIN/CREAT 24H UR-RTO: 582 MG/G
MICROSCOPIC-UA: NORMAL
NITRITE URINE: NEGATIVE
PARATHYROID HORMONE INTACT: 49 PG/ML
PH URINE: 5.5
PHOSPHATE SERPL-MCNC: 3.4 MG/DL
POTASSIUM SERPL-SCNC: 4.1 MMOL/L
PROT UR-MCNC: 66 MG/DL
PROTEIN URINE: 100 MG/DL
RED BLOOD CELLS URINE: 0 /HPF
SODIUM SERPL-SCNC: 143 MMOL/L
SPECIFIC GRAVITY URINE: 1.01
T3 SERPL-MCNC: 77 NG/DL
T4 FREE SERPL-MCNC: 1.4 NG/DL
TSH SERPL-ACNC: 1.5 UIU/ML
UROBILINOGEN URINE: 0.2 MG/DL
WHITE BLOOD CELLS URINE: 0 /HPF

## 2025-07-01 ENCOUNTER — NON-APPOINTMENT (OUTPATIENT)
Age: 60
End: 2025-07-01

## 2025-07-01 ENCOUNTER — APPOINTMENT (OUTPATIENT)
Dept: OPHTHALMOLOGY | Facility: CLINIC | Age: 60
End: 2025-07-01
Payer: COMMERCIAL

## 2025-07-01 PROCEDURE — 92285 EXTERNAL OCULAR PHOTOGRAPHY: CPT

## 2025-07-01 PROCEDURE — 99214 OFFICE O/P EST MOD 30 MIN: CPT

## 2025-07-29 ENCOUNTER — APPOINTMENT (OUTPATIENT)
Dept: ENDOCRINOLOGY | Facility: CLINIC | Age: 60
End: 2025-07-29
Payer: COMMERCIAL

## 2025-07-29 ENCOUNTER — NON-APPOINTMENT (OUTPATIENT)
Age: 60
End: 2025-07-29

## 2025-07-29 VITALS
OXYGEN SATURATION: 99 % | BODY MASS INDEX: 29.33 KG/M2 | SYSTOLIC BLOOD PRESSURE: 140 MMHG | DIASTOLIC BLOOD PRESSURE: 90 MMHG | HEART RATE: 64 BPM | WEIGHT: 198 LBS | HEIGHT: 69 IN

## 2025-07-29 DIAGNOSIS — E11.9 TYPE 2 DIABETES MELLITUS W/OUT COMPLICATIONS: ICD-10-CM

## 2025-07-29 DIAGNOSIS — E04.1 NONTOXIC SINGLE THYROID NODULE: ICD-10-CM

## 2025-07-29 DIAGNOSIS — E05.90 THYROTOXICOSIS, UNSPECIFIED W/OUT THYROTOXIC CRISIS OR STORM: ICD-10-CM

## 2025-07-29 DIAGNOSIS — E26.9 HYPERALDOSTERONISM, UNSPECIFIED: ICD-10-CM

## 2025-07-29 DIAGNOSIS — I10 ESSENTIAL (PRIMARY) HYPERTENSION: ICD-10-CM

## 2025-07-29 DIAGNOSIS — E78.5 HYPERLIPIDEMIA, UNSPECIFIED: ICD-10-CM

## 2025-07-29 DIAGNOSIS — D35.00 BENIGN NEOPLASM OF UNSPECIFIED ADRENAL GLAND: ICD-10-CM

## 2025-07-29 LAB
GLUCOSE BLDC GLUCOMTR-MCNC: 216
HBA1C MFR BLD HPLC: 7.9

## 2025-07-29 PROCEDURE — 99215 OFFICE O/P EST HI 40 MIN: CPT

## 2025-07-29 PROCEDURE — 83036 HEMOGLOBIN GLYCOSYLATED A1C: CPT | Mod: QW

## 2025-07-29 PROCEDURE — 95251 CONT GLUC MNTR ANALYSIS I&R: CPT

## 2025-07-29 PROCEDURE — 82962 GLUCOSE BLOOD TEST: CPT

## 2025-07-29 PROCEDURE — G2211 COMPLEX E/M VISIT ADD ON: CPT | Mod: NC

## 2025-07-29 RX ORDER — TIRZEPATIDE 2.5 MG/.5ML
2.5 INJECTION, SOLUTION SUBCUTANEOUS
Qty: 2 | Refills: 3 | Status: ACTIVE | COMMUNITY
Start: 2025-07-29 | End: 1900-01-01

## 2025-07-29 RX ORDER — METOPROLOL SUCCINATE 100 MG/1
100 TABLET, EXTENDED RELEASE ORAL DAILY
Qty: 90 | Refills: 1 | Status: ACTIVE | COMMUNITY
Start: 2025-07-29 | End: 1900-01-01

## 2025-07-29 RX ORDER — ROSUVASTATIN CALCIUM 20 MG/1
20 TABLET, FILM COATED ORAL DAILY
Qty: 90 | Refills: 3 | Status: ACTIVE | COMMUNITY
Start: 2025-07-29 | End: 1900-01-01

## 2025-07-30 LAB
ACTH SER-ACNC: 9.1 PG/ML
ALBUMIN SERPL ELPH-MCNC: 4.8 G/DL
ALP BLD-CCNC: 48 U/L
ALT SERPL-CCNC: 22 U/L
ANION GAP SERPL CALC-SCNC: 17 MMOL/L
AST SERPL-CCNC: 19 U/L
BASOPHILS # BLD AUTO: 0.06 K/UL
BASOPHILS NFR BLD AUTO: 0.9 %
BILIRUB SERPL-MCNC: 0.2 MG/DL
BUN SERPL-MCNC: 37 MG/DL
CALCIUM SERPL-MCNC: 10.1 MG/DL
CHLORIDE SERPL-SCNC: 104 MMOL/L
CO2 SERPL-SCNC: 21 MMOL/L
CREAT SERPL-MCNC: 2.93 MG/DL
EGFRCR SERPLBLD CKD-EPI 2021: 24 ML/MIN/1.73M2
EOSINOPHIL # BLD AUTO: 0.39 K/UL
EOSINOPHIL NFR BLD AUTO: 6.2 %
GLUCOSE SERPL-MCNC: 183 MG/DL
HCT VFR BLD CALC: 34 %
HGB BLD-MCNC: 11.4 G/DL
IMM GRANULOCYTES NFR BLD AUTO: 0.2 %
LYMPHOCYTES # BLD AUTO: 2.5 K/UL
LYMPHOCYTES NFR BLD AUTO: 39.5 %
MAN DIFF?: NORMAL
MCHC RBC-ENTMCNC: 27.5 PG
MCHC RBC-ENTMCNC: 33.5 G/DL
MCV RBC AUTO: 82.1 FL
MONOCYTES # BLD AUTO: 0.45 K/UL
MONOCYTES NFR BLD AUTO: 7.1 %
NEUTROPHILS # BLD AUTO: 2.92 K/UL
NEUTROPHILS NFR BLD AUTO: 46.1 %
PLATELET # BLD AUTO: 264 K/UL
POTASSIUM SERPL-SCNC: 4.3 MMOL/L
PROT SERPL-MCNC: 7.4 G/DL
RBC # BLD: 4.14 M/UL
RBC # FLD: 13.2 %
SODIUM SERPL-SCNC: 142 MMOL/L
T3 SERPL-MCNC: 92 NG/DL
T4 FREE SERPL-MCNC: 1.3 NG/DL
TSH RECEPTOR AB: 1.58 IU/L
TSH SERPL-ACNC: 1.31 UIU/ML
WBC # FLD AUTO: 6.33 K/UL

## 2025-07-31 ENCOUNTER — NON-APPOINTMENT (OUTPATIENT)
Age: 60
End: 2025-07-31

## 2025-07-31 LAB — TSI ACT/NOR SER: 0.42 IU/L

## 2025-08-06 LAB — DHEA-SULFATE, SERUM: 34 UG/DL

## 2025-08-07 ENCOUNTER — APPOINTMENT (OUTPATIENT)
Dept: CT IMAGING | Facility: IMAGING CENTER | Age: 60
End: 2025-08-07
Payer: COMMERCIAL

## 2025-08-07 ENCOUNTER — OUTPATIENT (OUTPATIENT)
Dept: OUTPATIENT SERVICES | Facility: HOSPITAL | Age: 60
LOS: 1 days | End: 2025-08-07
Payer: COMMERCIAL

## 2025-08-07 ENCOUNTER — APPOINTMENT (OUTPATIENT)
Dept: MRI IMAGING | Facility: IMAGING CENTER | Age: 60
End: 2025-08-07
Payer: COMMERCIAL

## 2025-08-07 DIAGNOSIS — D35.00 BENIGN NEOPLASM OF UNSPECIFIED ADRENAL GLAND: ICD-10-CM

## 2025-08-07 DIAGNOSIS — Z00.8 ENCOUNTER FOR OTHER GENERAL EXAMINATION: ICD-10-CM

## 2025-08-07 PROCEDURE — 74150 CT ABDOMEN W/O CONTRAST: CPT | Mod: 26

## 2025-08-07 PROCEDURE — 74150 CT ABDOMEN W/O CONTRAST: CPT

## 2025-08-07 PROCEDURE — 70543 MRI ORBT/FAC/NCK W/O &W/DYE: CPT

## 2025-08-07 PROCEDURE — A9585: CPT

## 2025-08-07 PROCEDURE — 70543 MRI ORBT/FAC/NCK W/O &W/DYE: CPT | Mod: 26

## 2025-08-19 RX ORDER — BLOOD-GLUCOSE SENSOR
EACH MISCELLANEOUS
Qty: 2 | Refills: 11 | Status: ACTIVE | COMMUNITY
Start: 2025-08-19 | End: 1900-01-01

## 2025-08-19 RX ORDER — AMLODIPINE BESYLATE 5 MG/1
5 TABLET ORAL DAILY
Qty: 90 | Refills: 1 | Status: ACTIVE | COMMUNITY
Start: 2025-08-19 | End: 1900-01-01

## 2025-08-19 RX ORDER — SPIRONOLACTONE 25 MG/1
25 TABLET ORAL DAILY
Qty: 90 | Refills: 1 | Status: ACTIVE | COMMUNITY
Start: 2025-08-19 | End: 1900-01-01

## 2025-08-26 ENCOUNTER — NON-APPOINTMENT (OUTPATIENT)
Age: 60
End: 2025-08-26

## 2025-08-26 ENCOUNTER — APPOINTMENT (OUTPATIENT)
Dept: OPHTHALMOLOGY | Facility: CLINIC | Age: 60
End: 2025-08-26
Payer: COMMERCIAL

## 2025-08-26 PROCEDURE — 92285 EXTERNAL OCULAR PHOTOGRAPHY: CPT

## 2025-08-26 PROCEDURE — 99214 OFFICE O/P EST MOD 30 MIN: CPT

## 2025-09-08 ENCOUNTER — APPOINTMENT (OUTPATIENT)
Dept: NEPHROLOGY | Facility: CLINIC | Age: 60
End: 2025-09-08
Payer: COMMERCIAL

## 2025-09-08 VITALS
SYSTOLIC BLOOD PRESSURE: 142 MMHG | HEART RATE: 66 BPM | BODY MASS INDEX: 28 KG/M2 | RESPIRATION RATE: 16 BRPM | DIASTOLIC BLOOD PRESSURE: 82 MMHG | OXYGEN SATURATION: 98 % | WEIGHT: 189.6 LBS

## 2025-09-08 DIAGNOSIS — N28.9 DISORDER OF KIDNEY AND URETER, UNSPECIFIED: ICD-10-CM

## 2025-09-08 DIAGNOSIS — I10 ESSENTIAL (PRIMARY) HYPERTENSION: ICD-10-CM

## 2025-09-08 DIAGNOSIS — E26.9 HYPERALDOSTERONISM, UNSPECIFIED: ICD-10-CM

## 2025-09-08 DIAGNOSIS — N18.32 CHRONIC KIDNEY DISEASE, STAGE 3B: ICD-10-CM

## 2025-09-08 PROCEDURE — G2211 COMPLEX E/M VISIT ADD ON: CPT | Mod: NC

## 2025-09-08 PROCEDURE — 99214 OFFICE O/P EST MOD 30 MIN: CPT

## 2025-09-09 LAB
ALBUMIN SERPL ELPH-MCNC: 4.9 G/DL
ANION GAP SERPL CALC-SCNC: 14 MMOL/L
BUN SERPL-MCNC: 48 MG/DL
CALCIUM SERPL-MCNC: 9.7 MG/DL
CHLORIDE SERPL-SCNC: 104 MMOL/L
CO2 SERPL-SCNC: 22 MMOL/L
CREAT SERPL-MCNC: 3.01 MG/DL
CREAT SPEC-SCNC: 50 MG/DL
CREAT/PROT UR: 0.9 RATIO
EGFRCR SERPLBLD CKD-EPI 2021: 23 ML/MIN/1.73M2
GLUCOSE SERPL-MCNC: 354 MG/DL
PHOSPHATE SERPL-MCNC: 3.4 MG/DL
POTASSIUM SERPL-SCNC: 4.8 MMOL/L
PROT UR-MCNC: 43 MG/DL
SODIUM SERPL-SCNC: 139 MMOL/L

## 2025-09-10 LAB — ALDOSTERONE SERUM: 35.6 NG/DL

## 2025-09-15 ENCOUNTER — APPOINTMENT (OUTPATIENT)
Dept: NEPHROLOGY | Facility: CLINIC | Age: 60
End: 2025-09-15

## 2025-09-15 LAB — RENIN ACTIVITY, PLASMA: 0.17 NG/ML/HR
